# Patient Record
Sex: MALE | Race: WHITE | NOT HISPANIC OR LATINO | Employment: OTHER | URBAN - METROPOLITAN AREA
[De-identification: names, ages, dates, MRNs, and addresses within clinical notes are randomized per-mention and may not be internally consistent; named-entity substitution may affect disease eponyms.]

---

## 2017-02-15 ENCOUNTER — ALLSCRIPTS OFFICE VISIT (OUTPATIENT)
Dept: OTHER | Facility: OTHER | Age: 77
End: 2017-02-15

## 2017-02-15 ENCOUNTER — HOSPITAL ENCOUNTER (INPATIENT)
Facility: HOSPITAL | Age: 77
LOS: 3 days | Discharge: HOME HEALTH | DRG: 394 | End: 2017-02-18
Attending: EMERGENCY MEDICINE | Admitting: FAMILY MEDICINE
Payer: MEDICARE

## 2017-02-15 ENCOUNTER — APPOINTMENT (EMERGENCY)
Dept: CT IMAGING | Facility: HOSPITAL | Age: 77
DRG: 394 | End: 2017-02-15
Payer: MEDICARE

## 2017-02-15 DIAGNOSIS — F03.91 DEMENTIA WITH BEHAVIORAL DISTURBANCE, UNSPECIFIED DEMENTIA TYPE (HCC): Chronic | ICD-10-CM

## 2017-02-15 DIAGNOSIS — R73.9 HYPERGLYCEMIA: ICD-10-CM

## 2017-02-15 DIAGNOSIS — K62.5 RECTAL BLEEDING: ICD-10-CM

## 2017-02-15 DIAGNOSIS — R41.82 ALTERED MENTAL STATUS: Primary | ICD-10-CM

## 2017-02-15 DIAGNOSIS — N39.0 UTI (URINARY TRACT INFECTION): ICD-10-CM

## 2017-02-15 DIAGNOSIS — E87.1 HYPONATREMIA: ICD-10-CM

## 2017-02-15 PROBLEM — I10 HTN (HYPERTENSION): Chronic | Status: ACTIVE | Noted: 2017-02-15

## 2017-02-15 PROBLEM — I48.91 A-FIB (HCC): Chronic | Status: ACTIVE | Noted: 2017-02-15

## 2017-02-15 PROBLEM — I63.9 CVA (CEREBRAL VASCULAR ACCIDENT) (HCC): Chronic | Status: ACTIVE | Noted: 2017-02-15

## 2017-02-15 PROBLEM — E78.5 HYPERLIPIDEMIA: Chronic | Status: ACTIVE | Noted: 2017-02-15

## 2017-02-15 PROBLEM — E11.9 DIABETES MELLITUS (HCC): Chronic | Status: ACTIVE | Noted: 2017-02-15

## 2017-02-15 LAB
ABO GROUP BLD: NORMAL
ALBUMIN SERPL BCP-MCNC: 2.5 G/DL (ref 3.5–5)
ALP SERPL-CCNC: 76 U/L (ref 46–116)
ALT SERPL W P-5'-P-CCNC: 13 U/L (ref 12–78)
ANION GAP SERPL CALCULATED.3IONS-SCNC: 12 MMOL/L (ref 4–13)
APTT PPP: 35 SECONDS (ref 24–36)
AST SERPL W P-5'-P-CCNC: 23 U/L (ref 5–45)
BACTERIA UR QL AUTO: ABNORMAL /HPF
BASOPHILS # BLD AUTO: 0.01 THOUSANDS/ΜL (ref 0–0.1)
BASOPHILS NFR BLD AUTO: 0 % (ref 0–1)
BILIRUB SERPL-MCNC: 0.8 MG/DL (ref 0.2–1)
BILIRUB UR QL STRIP: NEGATIVE
BLD GP AB SCN SERPL QL: NEGATIVE
BUN SERPL-MCNC: 23 MG/DL (ref 5–25)
CALCIUM SERPL-MCNC: 9.2 MG/DL (ref 8.3–10.1)
CHLORIDE SERPL-SCNC: 91 MMOL/L (ref 100–108)
CLARITY UR: ABNORMAL
CO2 SERPL-SCNC: 26 MMOL/L (ref 21–32)
COLOR UR: YELLOW
CREAT SERPL-MCNC: 1.09 MG/DL (ref 0.6–1.3)
EOSINOPHIL # BLD AUTO: 0.01 THOUSAND/ΜL (ref 0–0.61)
EOSINOPHIL NFR BLD AUTO: 0 % (ref 0–6)
ERYTHROCYTE [DISTWIDTH] IN BLOOD BY AUTOMATED COUNT: 16.6 % (ref 11.6–15.1)
GFR SERPL CREATININE-BSD FRML MDRD: >60 ML/MIN/1.73SQ M
GLUCOSE SERPL-MCNC: 326 MG/DL (ref 65–140)
GLUCOSE SERPL-MCNC: 379 MG/DL (ref 65–140)
GLUCOSE UR STRIP-MCNC: ABNORMAL MG/DL
HCT VFR BLD AUTO: 36.6 % (ref 36.5–49.3)
HGB BLD-MCNC: 12.4 G/DL (ref 12–17)
HGB UR QL STRIP.AUTO: ABNORMAL
INR PPP: 1.27 (ref 0.86–1.16)
KETONES UR STRIP-MCNC: ABNORMAL MG/DL
LEUKOCYTE ESTERASE UR QL STRIP: ABNORMAL
LYMPHOCYTES # BLD AUTO: 0.62 THOUSANDS/ΜL (ref 0.6–4.47)
LYMPHOCYTES NFR BLD AUTO: 9 % (ref 14–44)
MCH RBC QN AUTO: 29.9 PG (ref 26.8–34.3)
MCHC RBC AUTO-ENTMCNC: 33.9 G/DL (ref 31.4–37.4)
MCV RBC AUTO: 88 FL (ref 82–98)
MONOCYTES # BLD AUTO: 0.59 THOUSAND/ΜL (ref 0.17–1.22)
MONOCYTES NFR BLD AUTO: 8 % (ref 4–12)
NEUTROPHILS # BLD AUTO: 5.81 THOUSANDS/ΜL (ref 1.85–7.62)
NEUTS SEG NFR BLD AUTO: 79 % (ref 43–75)
NITRITE UR QL STRIP: POSITIVE
NON-SQ EPI CELLS URNS QL MICRO: ABNORMAL /HPF
NRBC BLD AUTO-RTO: 0 /100 WBCS
OSMOLALITY UR: 551 MMOL/KG
PH UR STRIP.AUTO: 5.5 [PH] (ref 4.5–8)
PLATELET # BLD AUTO: 160 THOUSANDS/UL (ref 149–390)
PMV BLD AUTO: 11.6 FL (ref 8.9–12.7)
POTASSIUM SERPL-SCNC: 4 MMOL/L (ref 3.5–5.3)
PROT SERPL-MCNC: 7.4 G/DL (ref 6.4–8.2)
PROT UR STRIP-MCNC: ABNORMAL MG/DL
PROTHROMBIN TIME: 15.7 SECONDS (ref 12–14.3)
RBC # BLD AUTO: 4.15 MILLION/UL (ref 3.88–5.62)
RBC #/AREA URNS AUTO: ABNORMAL /HPF
RH BLD: NEGATIVE
SODIUM 24H UR-SCNC: 30 MOL/L
SODIUM SERPL-SCNC: 129 MMOL/L (ref 136–145)
SP GR UR STRIP.AUTO: 1.01 (ref 1–1.03)
TROPONIN I SERPL-MCNC: <0.02 NG/ML
UROBILINOGEN UR QL STRIP.AUTO: 0.2 E.U./DL
WBC # BLD AUTO: 7.33 THOUSAND/UL (ref 4.31–10.16)
WBC #/AREA URNS AUTO: ABNORMAL /HPF

## 2017-02-15 PROCEDURE — 84484 ASSAY OF TROPONIN QUANT: CPT | Performed by: EMERGENCY MEDICINE

## 2017-02-15 PROCEDURE — 81001 URINALYSIS AUTO W/SCOPE: CPT | Performed by: EMERGENCY MEDICINE

## 2017-02-15 PROCEDURE — 85610 PROTHROMBIN TIME: CPT | Performed by: EMERGENCY MEDICINE

## 2017-02-15 PROCEDURE — 86900 BLOOD TYPING SEROLOGIC ABO: CPT | Performed by: EMERGENCY MEDICINE

## 2017-02-15 PROCEDURE — 96372 THER/PROPH/DIAG INJ SC/IM: CPT

## 2017-02-15 PROCEDURE — 83935 ASSAY OF URINE OSMOLALITY: CPT | Performed by: FAMILY MEDICINE

## 2017-02-15 PROCEDURE — 85730 THROMBOPLASTIN TIME PARTIAL: CPT | Performed by: EMERGENCY MEDICINE

## 2017-02-15 PROCEDURE — 87077 CULTURE AEROBIC IDENTIFY: CPT | Performed by: EMERGENCY MEDICINE

## 2017-02-15 PROCEDURE — 86901 BLOOD TYPING SEROLOGIC RH(D): CPT | Performed by: EMERGENCY MEDICINE

## 2017-02-15 PROCEDURE — 96360 HYDRATION IV INFUSION INIT: CPT

## 2017-02-15 PROCEDURE — 86850 RBC ANTIBODY SCREEN: CPT | Performed by: EMERGENCY MEDICINE

## 2017-02-15 PROCEDURE — 82948 REAGENT STRIP/BLOOD GLUCOSE: CPT

## 2017-02-15 PROCEDURE — 87086 URINE CULTURE/COLONY COUNT: CPT | Performed by: EMERGENCY MEDICINE

## 2017-02-15 PROCEDURE — 96361 HYDRATE IV INFUSION ADD-ON: CPT

## 2017-02-15 PROCEDURE — 80053 COMPREHEN METABOLIC PANEL: CPT | Performed by: EMERGENCY MEDICINE

## 2017-02-15 PROCEDURE — 87186 SC STD MICRODIL/AGAR DIL: CPT | Performed by: EMERGENCY MEDICINE

## 2017-02-15 PROCEDURE — 84300 ASSAY OF URINE SODIUM: CPT | Performed by: FAMILY MEDICINE

## 2017-02-15 PROCEDURE — 36415 COLL VENOUS BLD VENIPUNCTURE: CPT | Performed by: EMERGENCY MEDICINE

## 2017-02-15 PROCEDURE — 93005 ELECTROCARDIOGRAM TRACING: CPT | Performed by: EMERGENCY MEDICINE

## 2017-02-15 PROCEDURE — 70450 CT HEAD/BRAIN W/O DYE: CPT

## 2017-02-15 PROCEDURE — 85025 COMPLETE CBC W/AUTO DIFF WBC: CPT | Performed by: EMERGENCY MEDICINE

## 2017-02-15 RX ORDER — HALOPERIDOL 5 MG/ML
5 INJECTION INTRAMUSCULAR ONCE
Status: COMPLETED | OUTPATIENT
Start: 2017-02-15 | End: 2017-02-15

## 2017-02-15 RX ORDER — GEMFIBROZIL 600 MG/1
600 TABLET, FILM COATED ORAL
COMMUNITY

## 2017-02-15 RX ORDER — GEMFIBROZIL 600 MG/1
600 TABLET, FILM COATED ORAL
Status: DISCONTINUED | OUTPATIENT
Start: 2017-02-16 | End: 2017-02-18 | Stop reason: HOSPADM

## 2017-02-15 RX ORDER — LEVETIRACETAM 500 MG/1
500 TABLET ORAL 2 TIMES DAILY
Status: DISCONTINUED | OUTPATIENT
Start: 2017-02-15 | End: 2017-02-18 | Stop reason: HOSPADM

## 2017-02-15 RX ORDER — AZATHIOPRINE 50 MG/1
50 TABLET ORAL DAILY
Status: DISCONTINUED | OUTPATIENT
Start: 2017-02-16 | End: 2017-02-18 | Stop reason: HOSPADM

## 2017-02-15 RX ORDER — LEVETIRACETAM 500 MG/1
500 TABLET ORAL 2 TIMES DAILY
COMMUNITY
End: 2018-01-29 | Stop reason: SDUPTHER

## 2017-02-15 RX ORDER — RISPERIDONE 0.25 MG/1
0.5 TABLET, FILM COATED ORAL
Status: DISCONTINUED | OUTPATIENT
Start: 2017-02-15 | End: 2017-02-18 | Stop reason: HOSPADM

## 2017-02-15 RX ORDER — RISPERIDONE 0.5 MG/1
0.5 TABLET, FILM COATED ORAL
COMMUNITY
End: 2018-03-10

## 2017-02-15 RX ORDER — ATORVASTATIN CALCIUM 20 MG/1
20 TABLET, FILM COATED ORAL DAILY
COMMUNITY
End: 2018-01-26 | Stop reason: SDUPTHER

## 2017-02-15 RX ORDER — HALOPERIDOL 5 MG/ML
INJECTION INTRAMUSCULAR
Status: DISPENSED
Start: 2017-02-15 | End: 2017-02-16

## 2017-02-15 RX ORDER — MESALAMINE 375 MG/1
750 CAPSULE, EXTENDED RELEASE ORAL DAILY
COMMUNITY

## 2017-02-15 RX ORDER — DONEPEZIL HYDROCHLORIDE 5 MG/1
10 TABLET, FILM COATED ORAL
Status: DISCONTINUED | OUTPATIENT
Start: 2017-02-15 | End: 2017-02-18 | Stop reason: HOSPADM

## 2017-02-15 RX ORDER — LORAZEPAM 2 MG/ML
1 INJECTION INTRAMUSCULAR EVERY 6 HOURS PRN
Status: DISCONTINUED | OUTPATIENT
Start: 2017-02-15 | End: 2017-02-18 | Stop reason: HOSPADM

## 2017-02-15 RX ORDER — LORAZEPAM 2 MG/ML
1 INJECTION INTRAMUSCULAR ONCE
Status: DISCONTINUED | OUTPATIENT
Start: 2017-02-15 | End: 2017-02-18 | Stop reason: HOSPADM

## 2017-02-15 RX ORDER — AZATHIOPRINE 50 MG/1
50 TABLET ORAL DAILY
COMMUNITY

## 2017-02-15 RX ORDER — MESALAMINE 400 MG/1
400 CAPSULE, DELAYED RELEASE ORAL 3 TIMES DAILY
Status: DISCONTINUED | OUTPATIENT
Start: 2017-02-15 | End: 2017-02-18 | Stop reason: HOSPADM

## 2017-02-15 RX ORDER — ATORVASTATIN CALCIUM 20 MG/1
20 TABLET, FILM COATED ORAL DAILY
Status: DISCONTINUED | OUTPATIENT
Start: 2017-02-16 | End: 2017-02-18 | Stop reason: HOSPADM

## 2017-02-15 RX ORDER — PIOGLITAZONE HCL AND METFORMIN HCL 850; 15 MG/1; MG/1
1 TABLET ORAL 2 TIMES DAILY WITH MEALS
COMMUNITY
End: 2018-03-16 | Stop reason: HOSPADM

## 2017-02-15 RX ORDER — SODIUM CHLORIDE 9 MG/ML
75 INJECTION, SOLUTION INTRAVENOUS CONTINUOUS
Status: DISCONTINUED | OUTPATIENT
Start: 2017-02-15 | End: 2017-02-18 | Stop reason: HOSPADM

## 2017-02-15 RX ORDER — DONEPEZIL HYDROCHLORIDE 10 MG/1
10 TABLET, FILM COATED ORAL
COMMUNITY

## 2017-02-15 RX ADMIN — SODIUM CHLORIDE 75 ML/HR: 0.9 INJECTION, SOLUTION INTRAVENOUS at 23:44

## 2017-02-15 RX ADMIN — RISPERIDONE 0.5 MG: 1 TABLET, FILM COATED ORAL at 21:22

## 2017-02-15 RX ADMIN — SODIUM CHLORIDE 75 ML/HR: 0.9 INJECTION, SOLUTION INTRAVENOUS at 21:23

## 2017-02-15 RX ADMIN — SERTRALINE HYDROCHLORIDE 50 MG: 50 TABLET ORAL at 21:21

## 2017-02-15 RX ADMIN — LORAZEPAM 1 MG: 2 INJECTION INTRAMUSCULAR; INTRAVENOUS at 20:03

## 2017-02-15 RX ADMIN — LEVETIRACETAM 500 MG: 500 TABLET ORAL at 21:14

## 2017-02-15 RX ADMIN — APIXABAN 2.5 MG: 2.5 TABLET, FILM COATED ORAL at 21:13

## 2017-02-15 RX ADMIN — HALOPERIDOL LACTATE 5 MG: 5 INJECTION, SOLUTION INTRAMUSCULAR at 16:30

## 2017-02-15 RX ADMIN — CEFTRIAXONE 1000 MG: 1 INJECTION, POWDER, FOR SOLUTION INTRAMUSCULAR; INTRAVENOUS at 22:29

## 2017-02-15 RX ADMIN — SODIUM CHLORIDE 1000 ML: 0.9 INJECTION, SOLUTION INTRAVENOUS at 14:04

## 2017-02-15 RX ADMIN — INSULIN LISPRO 5 UNITS: 100 INJECTION, SOLUTION INTRAVENOUS; SUBCUTANEOUS at 21:27

## 2017-02-15 RX ADMIN — HALOPERIDOL LACTATE 5 MG: 5 INJECTION, SOLUTION INTRAMUSCULAR at 17:04

## 2017-02-15 RX ADMIN — DONEPEZIL HYDROCHLORIDE 10 MG: 5 TABLET, FILM COATED ORAL at 21:19

## 2017-02-15 RX ADMIN — SODIUM CHLORIDE 1000 ML: 0.9 INJECTION, SOLUTION INTRAVENOUS at 17:50

## 2017-02-16 LAB
ANION GAP SERPL CALCULATED.3IONS-SCNC: 13 MMOL/L (ref 4–13)
ATRIAL RATE: 75 BPM
BUN SERPL-MCNC: 14 MG/DL (ref 5–25)
CALCIUM SERPL-MCNC: 8.3 MG/DL (ref 8.3–10.1)
CHLORIDE SERPL-SCNC: 99 MMOL/L (ref 100–108)
CO2 SERPL-SCNC: 25 MMOL/L (ref 21–32)
CREAT SERPL-MCNC: 0.81 MG/DL (ref 0.6–1.3)
ERYTHROCYTE [DISTWIDTH] IN BLOOD BY AUTOMATED COUNT: 16.8 % (ref 11.6–15.1)
GFR SERPL CREATININE-BSD FRML MDRD: >60 ML/MIN/1.73SQ M
GLUCOSE SERPL-MCNC: 153 MG/DL (ref 65–140)
GLUCOSE SERPL-MCNC: 247 MG/DL (ref 65–140)
GLUCOSE SERPL-MCNC: 252 MG/DL (ref 65–140)
GLUCOSE SERPL-MCNC: 256 MG/DL (ref 65–140)
GLUCOSE SERPL-MCNC: 287 MG/DL (ref 65–140)
HCT VFR BLD AUTO: 34.7 % (ref 36.5–49.3)
HGB BLD-MCNC: 11.8 G/DL (ref 12–17)
LACTATE SERPL-SCNC: 0.9 MMOL/L (ref 0.5–2)
MCH RBC QN AUTO: 30.3 PG (ref 26.8–34.3)
MCHC RBC AUTO-ENTMCNC: 34 G/DL (ref 31.4–37.4)
MCV RBC AUTO: 89 FL (ref 82–98)
P AXIS: 57 DEGREES
PLATELET # BLD AUTO: 182 THOUSANDS/UL (ref 149–390)
PMV BLD AUTO: 11 FL (ref 8.9–12.7)
POTASSIUM SERPL-SCNC: 3.2 MMOL/L (ref 3.5–5.3)
POTASSIUM SERPL-SCNC: 3.6 MMOL/L (ref 3.5–5.3)
PR INTERVAL: 156 MS
QRS AXIS: -8 DEGREES
QRSD INTERVAL: 114 MS
QT INTERVAL: 406 MS
QTC INTERVAL: 453 MS
RBC # BLD AUTO: 3.9 MILLION/UL (ref 3.88–5.62)
SODIUM SERPL-SCNC: 137 MMOL/L (ref 136–145)
T WAVE AXIS: 26 DEGREES
VENTRICULAR RATE: 75 BPM
WBC # BLD AUTO: 7.08 THOUSAND/UL (ref 4.31–10.16)

## 2017-02-16 PROCEDURE — 82948 REAGENT STRIP/BLOOD GLUCOSE: CPT

## 2017-02-16 PROCEDURE — 84132 ASSAY OF SERUM POTASSIUM: CPT | Performed by: PHYSICIAN ASSISTANT

## 2017-02-16 PROCEDURE — 80048 BASIC METABOLIC PNL TOTAL CA: CPT | Performed by: PHYSICIAN ASSISTANT

## 2017-02-16 PROCEDURE — 99285 EMERGENCY DEPT VISIT HI MDM: CPT

## 2017-02-16 PROCEDURE — 36415 COLL VENOUS BLD VENIPUNCTURE: CPT | Performed by: FAMILY MEDICINE

## 2017-02-16 PROCEDURE — 83605 ASSAY OF LACTIC ACID: CPT | Performed by: INTERNAL MEDICINE

## 2017-02-16 PROCEDURE — 85027 COMPLETE CBC AUTOMATED: CPT | Performed by: FAMILY MEDICINE

## 2017-02-16 RX ORDER — INSULIN GLARGINE 100 [IU]/ML
10 INJECTION, SOLUTION SUBCUTANEOUS EVERY MORNING
Status: DISCONTINUED | OUTPATIENT
Start: 2017-02-16 | End: 2017-02-18 | Stop reason: HOSPADM

## 2017-02-16 RX ORDER — POTASSIUM CHLORIDE 20 MEQ/1
20 TABLET, EXTENDED RELEASE ORAL ONCE
Status: DISCONTINUED | OUTPATIENT
Start: 2017-02-16 | End: 2017-02-16

## 2017-02-16 RX ORDER — POTASSIUM CHLORIDE 14.9 MG/ML
20 INJECTION INTRAVENOUS ONCE
Status: COMPLETED | OUTPATIENT
Start: 2017-02-16 | End: 2017-02-16

## 2017-02-16 RX ADMIN — POTASSIUM CHLORIDE 20 MEQ: 200 INJECTION, SOLUTION INTRAVENOUS at 07:42

## 2017-02-16 RX ADMIN — INSULIN LISPRO 3 UNITS: 100 INJECTION, SOLUTION INTRAVENOUS; SUBCUTANEOUS at 22:16

## 2017-02-16 RX ADMIN — INSULIN LISPRO 12 UNITS: 100 INJECTION, SOLUTION INTRAVENOUS; SUBCUTANEOUS at 07:21

## 2017-02-16 RX ADMIN — CEFTRIAXONE 1000 MG: 1 INJECTION, POWDER, FOR SOLUTION INTRAMUSCULAR; INTRAVENOUS at 22:15

## 2017-02-17 LAB
BASOPHILS # BLD AUTO: 0.01 THOUSANDS/ΜL (ref 0–0.1)
BASOPHILS NFR BLD AUTO: 0 % (ref 0–1)
EOSINOPHIL # BLD AUTO: 0.02 THOUSAND/ΜL (ref 0–0.61)
EOSINOPHIL NFR BLD AUTO: 0 % (ref 0–6)
ERYTHROCYTE [DISTWIDTH] IN BLOOD BY AUTOMATED COUNT: 17.2 % (ref 11.6–15.1)
GLUCOSE SERPL-MCNC: 163 MG/DL (ref 65–140)
GLUCOSE SERPL-MCNC: 231 MG/DL (ref 65–140)
GLUCOSE SERPL-MCNC: 244 MG/DL (ref 65–140)
GLUCOSE SERPL-MCNC: 264 MG/DL (ref 65–140)
HCT VFR BLD AUTO: 36 % (ref 36.5–49.3)
HGB BLD-MCNC: 12.2 G/DL (ref 12–17)
LYMPHOCYTES # BLD AUTO: 0.76 THOUSANDS/ΜL (ref 0.6–4.47)
LYMPHOCYTES NFR BLD AUTO: 10 % (ref 14–44)
MCH RBC QN AUTO: 30.4 PG (ref 26.8–34.3)
MCHC RBC AUTO-ENTMCNC: 33.9 G/DL (ref 31.4–37.4)
MCV RBC AUTO: 90 FL (ref 82–98)
MONOCYTES # BLD AUTO: 0.58 THOUSAND/ΜL (ref 0.17–1.22)
MONOCYTES NFR BLD AUTO: 8 % (ref 4–12)
NEUTROPHILS # BLD AUTO: 5.66 THOUSANDS/ΜL (ref 1.85–7.62)
NEUTS SEG NFR BLD AUTO: 76 % (ref 43–75)
NRBC BLD AUTO-RTO: 0 /100 WBCS
PLATELET # BLD AUTO: 233 THOUSANDS/UL (ref 149–390)
PMV BLD AUTO: 10.9 FL (ref 8.9–12.7)
RBC # BLD AUTO: 4.01 MILLION/UL (ref 3.88–5.62)
WBC # BLD AUTO: 7.43 THOUSAND/UL (ref 4.31–10.16)

## 2017-02-17 PROCEDURE — 82948 REAGENT STRIP/BLOOD GLUCOSE: CPT

## 2017-02-17 PROCEDURE — 85025 COMPLETE CBC W/AUTO DIFF WBC: CPT | Performed by: INTERNAL MEDICINE

## 2017-02-17 RX ORDER — LISINOPRIL 10 MG/1
10 TABLET ORAL DAILY
Status: DISCONTINUED | OUTPATIENT
Start: 2017-02-17 | End: 2017-02-18 | Stop reason: HOSPADM

## 2017-02-17 RX ORDER — HYDROCORTISONE ACETATE 25 MG/1
25 SUPPOSITORY RECTAL 2 TIMES DAILY
Status: DISCONTINUED | OUTPATIENT
Start: 2017-02-17 | End: 2017-02-18 | Stop reason: HOSPADM

## 2017-02-17 RX ADMIN — HYDROCORTISONE ACETATE 25 MG: 25 SUPPOSITORY RECTAL at 12:25

## 2017-02-17 RX ADMIN — ATORVASTATIN CALCIUM 20 MG: 20 TABLET, FILM COATED ORAL at 09:42

## 2017-02-17 RX ADMIN — METFORMIN HYDROCHLORIDE: 500 TABLET, FILM COATED ORAL at 17:21

## 2017-02-17 RX ADMIN — APIXABAN 2.5 MG: 2.5 TABLET, FILM COATED ORAL at 17:22

## 2017-02-17 RX ADMIN — SERTRALINE HYDROCHLORIDE 50 MG: 50 TABLET ORAL at 23:22

## 2017-02-17 RX ADMIN — INSULIN LISPRO 1 UNITS: 100 INJECTION, SOLUTION INTRAVENOUS; SUBCUTANEOUS at 21:21

## 2017-02-17 RX ADMIN — MESALAMINE 400 MG: 400 CAPSULE, DELAYED RELEASE ORAL at 17:22

## 2017-02-17 RX ADMIN — INSULIN LISPRO 8 UNITS: 100 INJECTION, SOLUTION INTRAVENOUS; SUBCUTANEOUS at 07:00

## 2017-02-17 RX ADMIN — GEMFIBROZIL 600 MG: 600 TABLET ORAL at 17:21

## 2017-02-17 RX ADMIN — MESALAMINE 400 MG: 400 CAPSULE, DELAYED RELEASE ORAL at 09:42

## 2017-02-17 RX ADMIN — GEMFIBROZIL 600 MG: 600 TABLET ORAL at 09:42

## 2017-02-17 RX ADMIN — LISINOPRIL 10 MG: 10 TABLET ORAL at 10:20

## 2017-02-17 RX ADMIN — INSULIN LISPRO 8 UNITS: 100 INJECTION, SOLUTION INTRAVENOUS; SUBCUTANEOUS at 12:25

## 2017-02-17 RX ADMIN — INSULIN LISPRO 8 UNITS: 100 INJECTION, SOLUTION INTRAVENOUS; SUBCUTANEOUS at 17:30

## 2017-02-17 RX ADMIN — CEFTRIAXONE 1000 MG: 1 INJECTION, POWDER, FOR SOLUTION INTRAMUSCULAR; INTRAVENOUS at 23:22

## 2017-02-17 RX ADMIN — INSULIN GLARGINE 10 UNITS: 100 INJECTION, SOLUTION SUBCUTANEOUS at 09:51

## 2017-02-17 RX ADMIN — LEVETIRACETAM 500 MG: 500 TABLET ORAL at 09:42

## 2017-02-17 RX ADMIN — APIXABAN 2.5 MG: 2.5 TABLET, FILM COATED ORAL at 09:42

## 2017-02-17 RX ADMIN — METFORMIN HYDROCHLORIDE: 500 TABLET, FILM COATED ORAL at 10:24

## 2017-02-17 RX ADMIN — AZATHIOPRINE 50 MG: 50 TABLET ORAL at 09:42

## 2017-02-17 RX ADMIN — LEVETIRACETAM 500 MG: 500 TABLET ORAL at 17:21

## 2017-02-17 RX ADMIN — MESALAMINE 400 MG: 400 CAPSULE, DELAYED RELEASE ORAL at 21:18

## 2017-02-17 RX ADMIN — DONEPEZIL HYDROCHLORIDE 10 MG: 5 TABLET, FILM COATED ORAL at 21:18

## 2017-02-17 RX ADMIN — HYDROCORTISONE ACETATE 25 MG: 25 SUPPOSITORY RECTAL at 20:31

## 2017-02-17 RX ADMIN — RISPERIDONE 0.5 MG: 1 TABLET, FILM COATED ORAL at 21:18

## 2017-02-18 VITALS
OXYGEN SATURATION: 96 % | BODY MASS INDEX: 32.65 KG/M2 | WEIGHT: 208 LBS | RESPIRATION RATE: 18 BRPM | DIASTOLIC BLOOD PRESSURE: 62 MMHG | HEART RATE: 89 BPM | SYSTOLIC BLOOD PRESSURE: 135 MMHG | HEIGHT: 67 IN | TEMPERATURE: 97.8 F

## 2017-02-18 PROBLEM — E87.1 HYPONATREMIA: Status: RESOLVED | Noted: 2017-02-15 | Resolved: 2017-02-18

## 2017-02-18 PROBLEM — I63.9 CVA (CEREBRAL VASCULAR ACCIDENT) (HCC): Chronic | Status: RESOLVED | Noted: 2017-02-15 | Resolved: 2017-02-18

## 2017-02-18 PROBLEM — N39.0 UTI (URINARY TRACT INFECTION): Status: ACTIVE | Noted: 2017-02-18

## 2017-02-18 PROBLEM — K62.5 RECTAL BLEED: Status: RESOLVED | Noted: 2017-02-15 | Resolved: 2017-02-18

## 2017-02-18 PROBLEM — R41.82 ALTERED MENTAL STATUS: Status: RESOLVED | Noted: 2017-02-15 | Resolved: 2017-02-18

## 2017-02-18 LAB
CRP SERPL QL: 222.6 MG/L
ERYTHROCYTE [SEDIMENTATION RATE] IN BLOOD: 82 MM/HOUR (ref 0–10)
GLUCOSE SERPL-MCNC: 240 MG/DL (ref 65–140)
GLUCOSE SERPL-MCNC: 263 MG/DL (ref 65–140)

## 2017-02-18 PROCEDURE — 97163 PT EVAL HIGH COMPLEX 45 MIN: CPT

## 2017-02-18 PROCEDURE — 86140 C-REACTIVE PROTEIN: CPT | Performed by: INTERNAL MEDICINE

## 2017-02-18 PROCEDURE — 82948 REAGENT STRIP/BLOOD GLUCOSE: CPT

## 2017-02-18 PROCEDURE — G8979 MOBILITY GOAL STATUS: HCPCS

## 2017-02-18 PROCEDURE — G8978 MOBILITY CURRENT STATUS: HCPCS

## 2017-02-18 PROCEDURE — 85652 RBC SED RATE AUTOMATED: CPT | Performed by: INTERNAL MEDICINE

## 2017-02-18 RX ORDER — CEPHALEXIN 500 MG/1
500 CAPSULE ORAL 4 TIMES DAILY
Qty: 28 CAPSULE | Refills: 0 | Status: SHIPPED | OUTPATIENT
Start: 2017-02-18 | End: 2017-02-25

## 2017-02-18 RX ORDER — HYDROCORTISONE ACETATE 25 MG/1
25 SUPPOSITORY RECTAL 2 TIMES DAILY
Qty: 60 SUPPOSITORY | Refills: 0 | Status: SHIPPED | OUTPATIENT
Start: 2017-02-18 | End: 2018-03-16 | Stop reason: HOSPADM

## 2017-02-18 RX ORDER — LISINOPRIL 10 MG/1
10 TABLET ORAL DAILY
Qty: 30 TABLET | Refills: 0 | Status: SHIPPED | OUTPATIENT
Start: 2017-02-18 | End: 2018-03-10

## 2017-02-18 RX ORDER — LORAZEPAM 0.5 MG/1
0.5 TABLET ORAL EVERY 8 HOURS PRN
Qty: 10 TABLET | Refills: 0 | Status: SHIPPED | OUTPATIENT
Start: 2017-02-18 | End: 2018-03-05

## 2017-02-18 RX ADMIN — ATORVASTATIN CALCIUM 20 MG: 20 TABLET, FILM COATED ORAL at 09:55

## 2017-02-18 RX ADMIN — INSULIN LISPRO 8 UNITS: 100 INJECTION, SOLUTION INTRAVENOUS; SUBCUTANEOUS at 12:24

## 2017-02-18 RX ADMIN — MESALAMINE 400 MG: 400 CAPSULE, DELAYED RELEASE ORAL at 09:54

## 2017-02-18 RX ADMIN — INSULIN GLARGINE 10 UNITS: 100 INJECTION, SOLUTION SUBCUTANEOUS at 10:01

## 2017-02-18 RX ADMIN — METFORMIN HYDROCHLORIDE: 500 TABLET, FILM COATED ORAL at 09:54

## 2017-02-18 RX ADMIN — GEMFIBROZIL 600 MG: 600 TABLET ORAL at 06:24

## 2017-02-18 RX ADMIN — LEVETIRACETAM 500 MG: 500 TABLET ORAL at 09:55

## 2017-02-18 RX ADMIN — LISINOPRIL 10 MG: 10 TABLET ORAL at 09:55

## 2017-02-18 RX ADMIN — APIXABAN 2.5 MG: 2.5 TABLET, FILM COATED ORAL at 09:55

## 2017-02-18 RX ADMIN — AZATHIOPRINE 50 MG: 50 TABLET ORAL at 09:55

## 2017-02-18 RX ADMIN — INSULIN LISPRO 8 UNITS: 100 INJECTION, SOLUTION INTRAVENOUS; SUBCUTANEOUS at 07:51

## 2017-02-20 LAB
BACTERIA UR CULT: NORMAL
BACTERIA UR CULT: NORMAL

## 2017-03-01 ENCOUNTER — ALLSCRIPTS OFFICE VISIT (OUTPATIENT)
Dept: OTHER | Facility: OTHER | Age: 77
End: 2017-03-01

## 2017-03-01 DIAGNOSIS — K62.5 HEMORRHAGE OF ANUS AND RECTUM: ICD-10-CM

## 2017-03-01 DIAGNOSIS — E11.9 TYPE 2 DIABETES MELLITUS WITHOUT COMPLICATIONS (HCC): ICD-10-CM

## 2017-03-01 DIAGNOSIS — E78.5 HYPERLIPIDEMIA: ICD-10-CM

## 2017-03-07 ENCOUNTER — ALLSCRIPTS OFFICE VISIT (OUTPATIENT)
Dept: OTHER | Facility: OTHER | Age: 77
End: 2017-03-07

## 2017-03-21 ENCOUNTER — ANESTHESIA (OUTPATIENT)
Dept: PERIOP | Facility: HOSPITAL | Age: 77
End: 2017-03-21
Payer: MEDICARE

## 2017-03-21 ENCOUNTER — ANESTHESIA EVENT (OUTPATIENT)
Dept: PERIOP | Facility: HOSPITAL | Age: 77
End: 2017-03-21
Payer: MEDICARE

## 2017-03-21 ENCOUNTER — HOSPITAL ENCOUNTER (OUTPATIENT)
Facility: HOSPITAL | Age: 77
Setting detail: OUTPATIENT SURGERY
Discharge: HOME | End: 2017-03-21
Attending: INTERNAL MEDICINE | Admitting: INTERNAL MEDICINE
Payer: MEDICARE

## 2017-03-21 ENCOUNTER — GENERIC CONVERSION - ENCOUNTER (OUTPATIENT)
Dept: OTHER | Facility: OTHER | Age: 77
End: 2017-03-21

## 2017-03-21 VITALS
RESPIRATION RATE: 18 BRPM | SYSTOLIC BLOOD PRESSURE: 126 MMHG | DIASTOLIC BLOOD PRESSURE: 71 MMHG | HEIGHT: 66 IN | OXYGEN SATURATION: 96 % | WEIGHT: 205.47 LBS | TEMPERATURE: 98.4 F | BODY MASS INDEX: 33.02 KG/M2 | HEART RATE: 64 BPM

## 2017-03-21 LAB — GLUCOSE SERPL-MCNC: 279 MG/DL (ref 65–140)

## 2017-03-21 PROCEDURE — 82948 REAGENT STRIP/BLOOD GLUCOSE: CPT

## 2017-03-21 RX ORDER — LIDOCAINE HYDROCHLORIDE 10 MG/ML
INJECTION, SOLUTION INFILTRATION; PERINEURAL AS NEEDED
Status: DISCONTINUED | OUTPATIENT
Start: 2017-03-21 | End: 2017-03-21 | Stop reason: SURG

## 2017-03-21 RX ORDER — PROPOFOL 10 MG/ML
INJECTION, EMULSION INTRAVENOUS AS NEEDED
Status: DISCONTINUED | OUTPATIENT
Start: 2017-03-21 | End: 2017-03-21 | Stop reason: SURG

## 2017-03-21 RX ORDER — SODIUM CHLORIDE, SODIUM LACTATE, POTASSIUM CHLORIDE, CALCIUM CHLORIDE 600; 310; 30; 20 MG/100ML; MG/100ML; MG/100ML; MG/100ML
50 INJECTION, SOLUTION INTRAVENOUS CONTINUOUS
Status: DISCONTINUED | OUTPATIENT
Start: 2017-03-21 | End: 2017-03-21 | Stop reason: HOSPADM

## 2017-03-21 RX ADMIN — PROPOFOL 100 MG: 10 INJECTION, EMULSION INTRAVENOUS at 07:58

## 2017-03-21 RX ADMIN — PROPOFOL 20 MG: 10 INJECTION, EMULSION INTRAVENOUS at 08:00

## 2017-03-21 RX ADMIN — LIDOCAINE HYDROCHLORIDE 50 MG: 10 INJECTION, SOLUTION INFILTRATION; PERINEURAL at 07:55

## 2017-03-21 RX ADMIN — SODIUM CHLORIDE, SODIUM LACTATE, POTASSIUM CHLORIDE, AND CALCIUM CHLORIDE 50 ML/HR: .6; .31; .03; .02 INJECTION, SOLUTION INTRAVENOUS at 07:21

## 2017-11-15 DIAGNOSIS — E11.9 TYPE 2 DIABETES MELLITUS WITHOUT COMPLICATIONS (HCC): ICD-10-CM

## 2017-11-15 DIAGNOSIS — I10 ESSENTIAL (PRIMARY) HYPERTENSION: ICD-10-CM

## 2017-11-17 ENCOUNTER — APPOINTMENT (OUTPATIENT)
Dept: LAB | Facility: CLINIC | Age: 77
End: 2017-11-17
Payer: MEDICARE

## 2017-11-17 ENCOUNTER — TRANSCRIBE ORDERS (OUTPATIENT)
Dept: RADIOLOGY | Facility: CLINIC | Age: 77
End: 2017-11-17

## 2017-11-17 DIAGNOSIS — I10 ESSENTIAL (PRIMARY) HYPERTENSION: ICD-10-CM

## 2017-11-17 DIAGNOSIS — E11.9 TYPE 2 DIABETES MELLITUS WITHOUT COMPLICATIONS (HCC): ICD-10-CM

## 2017-11-17 LAB
ALBUMIN SERPL BCP-MCNC: 3.8 G/DL (ref 3.5–5)
ALP SERPL-CCNC: 72 U/L (ref 46–116)
ALT SERPL W P-5'-P-CCNC: 35 U/L (ref 12–78)
ANION GAP SERPL CALCULATED.3IONS-SCNC: 10 MMOL/L (ref 4–13)
AST SERPL W P-5'-P-CCNC: 30 U/L (ref 5–45)
BILIRUB SERPL-MCNC: 0.57 MG/DL (ref 0.2–1)
BUN SERPL-MCNC: 16 MG/DL (ref 5–25)
CALCIUM SERPL-MCNC: 8.8 MG/DL (ref 8.3–10.1)
CHLORIDE SERPL-SCNC: 97 MMOL/L (ref 100–108)
CHOLEST SERPL-MCNC: 143 MG/DL (ref 50–200)
CO2 SERPL-SCNC: 25 MMOL/L (ref 21–32)
CREAT SERPL-MCNC: 0.96 MG/DL (ref 0.6–1.3)
EST. AVERAGE GLUCOSE BLD GHB EST-MCNC: 303 MG/DL
GFR SERPL CREATININE-BSD FRML MDRD: 76 ML/MIN/1.73SQ M
GLUCOSE P FAST SERPL-MCNC: 349 MG/DL (ref 65–99)
HBA1C MFR BLD: 12.2 % (ref 4.2–6.3)
HDLC SERPL-MCNC: 43 MG/DL (ref 40–60)
LDLC SERPL CALC-MCNC: 66 MG/DL (ref 0–100)
POTASSIUM SERPL-SCNC: 3.9 MMOL/L (ref 3.5–5.3)
PROT SERPL-MCNC: 7.6 G/DL (ref 6.4–8.2)
SODIUM SERPL-SCNC: 132 MMOL/L (ref 136–145)
TRIGL SERPL-MCNC: 170 MG/DL
TSH SERPL DL<=0.05 MIU/L-ACNC: 1.38 UIU/ML (ref 0.36–3.74)

## 2017-11-17 PROCEDURE — 84443 ASSAY THYROID STIM HORMONE: CPT

## 2017-11-17 PROCEDURE — 80053 COMPREHEN METABOLIC PANEL: CPT

## 2017-11-17 PROCEDURE — 83036 HEMOGLOBIN GLYCOSYLATED A1C: CPT

## 2017-11-17 PROCEDURE — 80061 LIPID PANEL: CPT

## 2017-11-17 PROCEDURE — 36415 COLL VENOUS BLD VENIPUNCTURE: CPT

## 2017-11-20 ENCOUNTER — GENERIC CONVERSION - ENCOUNTER (OUTPATIENT)
Dept: OTHER | Facility: OTHER | Age: 77
End: 2017-11-20

## 2017-12-01 ENCOUNTER — ALLSCRIPTS OFFICE VISIT (OUTPATIENT)
Dept: OTHER | Facility: OTHER | Age: 77
End: 2017-12-01

## 2017-12-05 NOTE — PROGRESS NOTES
Assessment    1  Diabetes (250 00) (E11 9)   2  CVA (cerebrovascular accident) (434 91) (I63 9)   3  Atrial fibrillation (427 31) (I48 91)   4  Ulcerative colitis (556 9) (K51 90)   5  Hypertension (401 9) (I10)    Plan  Diabetes    · Januvia 100 MG Oral Tablet; TAKE 1 TABLET DAILY   · (1) COMPREHENSIVE METABOLIC PANEL; Status:Active; Requested for:61Tve9213;    · (1) HEMOGLOBIN A1C; Status:Active; Requested for:97Eax6154;    · (1) LIPID PANEL, FASTING; Status:Active; Requested for:05Axr7253;    · Follow-up visit in 4 Months Evaluation and Treatment  Follow-up  Status: Hold For -Scheduling  Requested for: 89UMF2269    Discussion/Summary  The patient, patient's family was counseled regarding diagnostic results,-- prognosis,-- importance of compliance with treatment  Possible side effects of new medications were reviewed with the patient/guardian today  The treatment plan was reviewed with the patient/guardian  The patient/guardian understands and agrees with the treatment plan      Chief Complaint  Pt in office today for a f/u on lab work  History of Present Illness  here for diabetes check, not checking his blood sugars regularly, eating anything   The patient presents with paroxysmal atrial fibrillation  The treatment strategy for this patient is rate control  He states his atrial fibrillation has been well controlled since the last visit  He has no significant interval events  Symptoms: The patient is currently asymptomatic  Medications: the patient is adherent with his medication regimen  -- He denies medication side effects  The patient is being seen for a routine clinic follow-up of cerebrovascular accident  The patient is currently asymptomatic  Current treatment includes antihypertensives and dyslipidemia medications  The patient is being seen for a routine clinic follow-up of ulcerative colitis  Current treatment includes azathioprine   By report, there is good compliance with treatment, good tolerance of treatment and good symptom control  The patient presents for follow-up of essential hypertension  The patient states he has been doing well with his blood pressure control since the last visit  He has no significant interval events  Symptoms: The patient is currently asymptomatic  Medications: the patient is adherent with his medication regimen  -- He denies medication side effects  Review of Systems   Constitutional: No fever or chills, feels well, no tiredness, no recent weight gain or weight loss  Eyes: No complaints of eye pain, no red eyes, no discharge from eyes, no itchy eyes  ENT: no complaints of earache, no hearing loss, no nosebleeds, no nasal discharge, no sore throat, no hoarseness  Cardiovascular: No complaints of slow heart rate, no fast heart rate, no chest pain, no palpitations, no leg claudication, no lower extremity  Respiratory: No complaints of shortness of breath, no wheezing, no cough, no SOB on exertion, no orthopnea or PND  Gastrointestinal: No complaints of abdominal pain, no constipation, no nausea or vomiting, no diarrhea or bloody stools  Genitourinary: nocturia  Musculoskeletal: No complaints of arthralgia, no myalgias, no joint swelling or stiffness, no limb pain or swelling  Integumentary: No complaints of skin rash or skin lesions, no itching, no skin wound, no dry skin  Neurological: No compliants of headache, no confusion, no convulsions, no numbness or tingling, no dizziness or fainting, no limb weakness, no difficulty walking  Psychiatric: Is not suicidal, no sleep disturbances, no anxiety or depression, no change in personality, no emotional problems  Endocrine: increased thirst   Hematologic/Lymphatic: No complaints of swollen glands, no swollen glands in the neck, does not bleed easily, no easy bruising  Active Problems  1  Abnormal stress test (794 39) (R94 39)   2  Anxiety (300 00) (F41 9)   3   Arteriosclerosis of coronary artery (414 00) (I25 10)   4  Atrial fibrillation (427 31) (I48 91)   5  Chronic anticoagulation (V58 61) (Z79 01)   6  CVA (cerebrovascular accident) (434 91) (I63 9)   7  Diabetes (250 00) (E11 9)   8  Diastolic dysfunction (228 3) (I51 9)   9  Encounter for special screening examination for genitourinary disorder (V81 6) (Z13 89)   10  Hyperlipidemia (272 4) (E78 5)   11  Hypertension (401 9) (I10)   12  Ischemic cardiomyopathy (414 8) (I25 5)   13  Medicare annual wellness visit, subsequent (V70 0) (Z00 00)   14  Memory loss (780 93) (R41 3)   15  Rectal bleeding (569 3) (K62 5)   16  S/P CABG (coronary artery bypass graft) (V45 81) (Z95 1)   17  Screening for neurological condition (V80 09) (Z13 89)   18  Seizure (780 39) (R56 9)   19  Shortness of breath on exertion (786 05) (R06 02)   20  Tiredness (780 79) (R53 83)   21  Ulcerative colitis (556 9) (K51 90)    Past Medical History  1  History of CVA (cerebral infarction) (434 91) (I63 9)   2  History of Epigastric pain (789 06) (R10 13)   3  History of Familial tremor (333 1) (G25 0)   4  History of diabetes mellitus (V12 29) (Z86 39)   5  History of EKG (V15 89) (Z92 89)   6  History of Hypercholesterolemia (272 0) (E78 00)    The active problems and past medical history were reviewed and updated today  Surgical History  1  History of CABG   2  History of Rotator Cuff Repair    The surgical history was reviewed and updated today  Family History  Mother    1  Family history of Lung cancer   2  Family history of Myocardial infarction  Father    3  Family history of Myocardial infarction    The family history was reviewed and updated today  Social History     · Former smoker (V54 40) (Q56 130)   · No alcohol use   · No drug use  The social history was reviewed and updated today  Current Meds   1  Apriso 0 375 GM Oral Capsule Extended Release 24 Hour; Therapy: 40DEH0936 to (Evaluate:75Rfa8448) Recorded   2   Atorvastatin Calcium 20 MG Oral Tablet; take 1 tablet by mouth once daily; Therapy: [facility]VWY7103 [facility]  Requested for: 68Unm3494; Last Rx:98Qqe3017 Ordered   3  AzaTHIOprine 50 MG Oral Tablet; Therapy: 50GNA9609 to (Evaluate:96Btd1713) Recorded   4  Donepezil HCl - 10 MG Oral Tablet; take 1 tablet every day; Therapy: 35Jrh4211 to (Evaluate:54Chv3993)  Requested for: 71YGQ3553; Last Rx:96Lrl8993 Ordered   5  Eliquis 2 5 MG Oral Tablet; take 1 tablet every twelve hours; Therapy: 54JHM9399 to (Ana Morton)  Requested for: 65KFQ4196; Last Rx:10Aio7517 Ordered   6  LevETIRAcetam 500 MG Oral Tablet; take 1 tablet by mouth twice a day; Therapy: 42Umq4126 to (Evaluate:99Jsy9934)  Requested for: 77OTY7866; Last Rx:50Clq0091 Ordered   7  LORazepam 0 5 MG Oral Tablet; take 1 tablet every 4 hours prn anxiety; Therapy: 36ZEY8554 to (Evaluate:01Mar2017); Last Rx:12Lso4489 Ordered   8  Pioglitazone HCl-Metformin HCl -  MG Oral Tablet; TAKE 1 TABLET Twice daily with food; Therapy: 93JCV8929 to (Jason Burrell)  Requested for: 49IUN1443; Last Rx:12Mra6334 Ordered   9  RisperiDONE 2 MG Oral Tablet; TAKE 1 TABLET AT BEDTIME; Therapy: 43OLO6292 to (Evaluate:32Ohf0213)  Requested for: 61HOR8639; Last Rx:11Wwi0877 Ordered   10  Sertraline HCl - 50 MG Oral Tablet; Take 1 tablet by mouth at bedtime; Therapy: 02XQH1922 to (Evaluate:03Zpn1169)  Requested for: 66Plp5470; Last  Rx:89Loy3106 Ordered    The medication list was reviewed and updated today  Allergies  1  No Known Drug Allergies    Vitals  Vital Signs    Recorded: 33QEG5896 01:15PM   Heart Rate 72   Systolic 886   Diastolic 70   Height 5 ft 5 in   Weight 217 lb    BMI Calculated 36 11   BSA Calculated 2 05   O2 Saturation 93       Physical Exam   Constitutional  General appearance: No acute distress, well appearing and well nourished  Ears, Nose, Mouth, and Throat  Oropharynx: Normal with no erythema, edema, exudate or lesions     Pulmonary  Auscultation of lungs: Clear to auscultation, equal breath sounds bilaterally, no wheezes, no rales, no rhonci  Cardiovascular  Auscultation of heart: Normal rate and rhythm, normal S1 and S2, without murmurs  Examination of extremities for edema and/or varicosities: Normal    Abdomen  Abdomen: Non-tender, no masses  Musculoskeletal  Gait and station: Normal    Psychiatric  Orientation to person, place and time: Abnormal   Mental Status: oriented to person  Mood and affect: Normal          Health Management  Diabetes   *VB - Eye Exam; every 1 year; Last 21SNP9251; Next Due: 17SWK5561; Overdue  Health Maintenance   Medicare Annual Wellness Visit; every 1 year; Last 17VLE3208; Next Due: 96CUN1374;  Overdue    Signatures   Electronically signed by : Ashlee Alexander DO; Dec  1 2017  1:41PM EST                       (Author)

## 2017-12-21 ENCOUNTER — LAB REQUISITION (OUTPATIENT)
Dept: LAB | Facility: HOSPITAL | Age: 77
End: 2017-12-21
Payer: MEDICARE

## 2017-12-21 ENCOUNTER — ALLSCRIPTS OFFICE VISIT (OUTPATIENT)
Dept: OTHER | Facility: OTHER | Age: 77
End: 2017-12-21

## 2017-12-21 DIAGNOSIS — R30.0 DYSURIA: ICD-10-CM

## 2017-12-21 LAB
CLARITY UR: NORMAL
COLOR UR: YELLOW
GLUCOSE (HISTORICAL): 4
HGB UR QL STRIP.AUTO: 3
KETONES UR STRIP-MCNC: NORMAL MG/DL
LEUKOCYTE ESTERASE UR QL STRIP: NORMAL
NITRITE UR QL STRIP: NORMAL
PH UR STRIP.AUTO: 9 [PH]
PROT UR STRIP-MCNC: NORMAL MG/DL
SP GR UR STRIP.AUTO: 1.02

## 2017-12-21 PROCEDURE — 87086 URINE CULTURE/COLONY COUNT: CPT | Performed by: FAMILY MEDICINE

## 2017-12-21 PROCEDURE — 87077 CULTURE AEROBIC IDENTIFY: CPT | Performed by: FAMILY MEDICINE

## 2017-12-21 PROCEDURE — 87186 SC STD MICRODIL/AGAR DIL: CPT | Performed by: FAMILY MEDICINE

## 2017-12-23 LAB — BACTERIA UR CULT: ABNORMAL

## 2018-01-13 VITALS
TEMPERATURE: 97.2 F | HEART RATE: 97 BPM | BODY MASS INDEX: 34.16 KG/M2 | OXYGEN SATURATION: 98 % | DIASTOLIC BLOOD PRESSURE: 78 MMHG | HEIGHT: 65 IN | WEIGHT: 205 LBS | SYSTOLIC BLOOD PRESSURE: 118 MMHG

## 2018-01-13 VITALS
WEIGHT: 203.5 LBS | SYSTOLIC BLOOD PRESSURE: 130 MMHG | BODY MASS INDEX: 33.91 KG/M2 | HEIGHT: 65 IN | DIASTOLIC BLOOD PRESSURE: 78 MMHG

## 2018-01-13 NOTE — MISCELLANEOUS
Assessment    1  Rectal bleeding (569 3) (K62 5)   2  Diabetes (250 00) (E11 9)   3  Atrial fibrillation (427 31) (I48 91)   4  Hyperlipidemia (272 4) (E78 5)   5  Hypertension (401 9) (I10)    Plan  Diabetes, Hyperlipidemia    · (1) COMPREHENSIVE METABOLIC PANEL; Status:Active; Requested for:07Mar2017;    Perform:[facility]; JUR:95UND0585; Ordered; For:Diabetes, Hyperlipidemia; Ordered By:Giovani Dixon;   · (1) HEMOGLOBIN A1C; Status:Active; Requested for:07Mar2017;    Perform:[facility]; NYD:06ZYB6553; Ordered; For:Diabetes, Hyperlipidemia; Ordered By:Giovani Dixon;   · (1) LIPID PANEL, FASTING; Status:Active; Requested for:07Mar2017;    Perform:[facility]; OJC:26MGH4308; Ordered; For:Diabetes, Hyperlipidemia; Ordered By:Mary Dixon;   · Follow-up visit in 4 Months Evaluation and Treatment  Follow-up  Status: Complete   Done: 16ROT9661   Ordered; For: Diabetes, Hyperlipidemia; Ordered By: Nuzhat Li Performed:  Due: 85RUJ9327; Last Updated By: Francesco Braden; 3/7/2017 10:40:21 AM  Encounter for special screening examination for genitourinary disorder    · *VB - Urinary Incontinence Screen (Dx Z13 89 Screen for UI); Status:Resulted - Requires  Verification,Retrospective By Protocol Authorization;   Done: 10MFC7267 10:06AM   Performed: In Office; Due:12Mar2017; Last Updated By:Anna Ramires; 3/7/2017 10:08:52 AM;Ordered;  For:Encounter for special screening examination for genitourinary disorder; Ordered By:Mary Dixon;  Screening for neurological condition    · *VB - Fall Risk Assessment  (Dx Z13 89 Screen for Neurologic Disorder);  Status:Resulted - Requires Verification,Retrospective By Protocol Authorization;   Done:  61GPW0362 10:08AM   Performed: In Office; Due:12Mar2017; Last Updated By:Anna Ramires; 3/7/2017 10:08:27 AM;Ordered;   For:Screening for neurological condition; Ordered By:Mary Dixon;    Discussion/Summary  Patient's Capacity to Self-Care: Patient is able to Self-Care  Counseling Documentation With Imm: The patient's family was counseled regarding diagnostic results, instructions for management, prognosis  Medication SE Review and Pt Understands Tx: Possible side effects of new medications were reviewed with the patient/guardian today  The treatment plan was reviewed with the patient/guardian  The patient/guardian understands and agrees with the treatment plan      Chief Complaint  Chief Complaint Free Text Note Form: KESHIA      History of Present Illness  TCM Communication [facility]: The patient is being contacted for follow-up after hospitalization  He was hospitalized at [facility]  The date of admission: 02/15/2017, date of discharge: 02/18/2017  He was discharged to home  Medications reviewed and updated today  He scheduled a follow up appointment  Symptoms: loose stools  The patient is currently experiencing symptoms  Counseling was provided to patient's family  Patient's wife  Communication performed and completed by Josiah Perkins       HPI: patient feeling better, less agitated, he was treated for a UTI and his agitation has improved  He is following up with   [facility], but the rectal bleeding has stopped  Review of Systems  Complete-Male:   Constitutional: No fever or chills, feels well, no tiredness, no recent weight gain or weight loss  Eyes: No complaints of eye pain, no red eyes, no discharge from eyes, no itchy eyes  ENT: no complaints of earache, no hearing loss, no nosebleeds, no nasal discharge, no sore throat, no hoarseness  Cardiovascular: No complaints of slow heart rate, no fast heart rate, no chest pain, no palpitations, no leg claudication, no lower extremity  Respiratory: No complaints of shortness of breath, no wheezing, no cough, no SOB on exertion, no orthopnea or PND  Gastrointestinal: No complaints of abdominal pain, no constipation, no nausea or vomiting, no diarrhea or bloody stools     Genitourinary: No complaints of dysuria, no incontinence, no hesitancy, no nocturia, no genital lesion, no testicular pain  Musculoskeletal: No complaints of arthralgia, no myalgias, no joint swelling or stiffness, no limb pain or swelling  Integumentary: No complaints of skin rash or skin lesions, no itching, no skin wound, no dry skin  Neurological: No compliants of headache, no confusion, no convulsions, no numbness or tingling, no dizziness or fainting, no limb weakness, no difficulty walking  Psychiatric: Is not suicidal, no sleep disturbances, no anxiety or depression, no change in personality, no emotional problems  Endocrine: No complaints of proptosis, no hot flashes, no muscle weakness, no erectile dysfunction, no deepening of the voice, no feelings of weakness  Hematologic/Lymphatic: No complaints of swollen glands, no swollen glands in the neck, does not bleed easily, no easy bruising  Active Problems     1  Abnormal stress test (794 39) (R94 39)   2  Anxiety (300 00) (F41 9)   3  Arteriosclerosis of coronary artery (414 00) (I25 10)   4  Atrial fibrillation (427 31) (I48 91)   5  Chronic anticoagulation (V58 61) (Z79 01)   6  CVA (cerebrovascular accident) (434 91) (I63 9)   7  Diabetes (250 00) (E11 9)   8  Diastolic dysfunction (495 4) (I51 9)   9  Hyperlipidemia (272 4) (E78 5)   10  Hypertension (401 9) (I10)   11  Ischemic cardiomyopathy (414 8) (I25 5)   12  Medicare annual wellness visit, subsequent (V70 0) (Z00 00)   13  Memory loss (780 93) (R41 3)   14  S/P CABG (coronary artery bypass graft) (V45 81) (Z95 1)   15  Seizure (780 39) (R56 9)   16  Shortness of breath on exertion (786 05) (R06 02)   17  Tiredness (780 79) (R53 83)   18  Ulcerative colitis (556 9) (K51 90)   19  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)    Rectal bleeding (569 3) (K62 5)          Past Medical History    1  History of CVA (cerebral infarction) (434 91) (I63 9)   2   History of Epigastric pain (789 06) (R10 13) 3  History of Familial tremor (333 1) (G25 0)   4  History of diabetes mellitus (V12 29) (Z86 39)   5  History of EKG (V15 89) (Z92 89)   6  History of Hypercholesterolemia (272 0) (E78 00)    Surgical History    1  History of CABG   2  History of Rotator Cuff Repair    Family History  Mother    1  Family history of Lung cancer   2  Family history of Myocardial infarction  Father    3  Family history of Myocardial infarction    Social History    · Former smoker (R51 07) (M53 567)   · No alcohol use   · No drug use    Current Meds   1  Apriso 0 375 GM Oral Capsule Extended Release 24 Hour; Therapy: 13MZT7158 to (Evaluate:80Kjb3224) Recorded   2  Atorvastatin Calcium 20 MG Oral Tablet; TAKE 1 TABLET DAILY; Therapy: 11CHX3550 to (Evaluate:28Edn2848)  Requested for: 01CSC6580; Last   Rx:17Jan2017 Ordered   3  AzaTHIOprine 50 MG Oral Tablet; Therapy: 72RFR4234 to (Evaluate:36Ejs3001) Recorded   4  Donepezil HCl - 10 MG Oral Tablet; take 1 tablet by mouth once daily; Therapy: 68Fem0383 to (Evaluate:16Apr2017)  Requested for: 88PNF9721; Last   Rx:48Asj4602 Ordered   5  Eliquis 2 5 MG Oral Tablet; take 1 tablet every twelve hours; Therapy: 25MZG6858 to (Evaluate:20Mar2017)  Requested for: 25EWB5688; Last   Rx:34Fld5468 Ordered   6  Gemfibrozil 600 MG Oral Tablet; TAKE 1 TABLET BY MOUTH TWICE A DAY WITH   MORNING AND EVENING MEAL; Therapy: 87DZM7320 to (([phone] [phone])  Requested for: 83Wge3378; Last   Rx:08Apr2016 Ordered   7  LevETIRAcetam 500 MG Oral Tablet; take 1 tablet by mouth twice a day; Therapy: 30Zdk9407 to (Evaluate:11Jan2017)  Requested for: 63Yio5635; Last   Rx:13Pdn0227 Ordered   8  Pioglitazone HCl-Metformin HCl -  MG Oral Tablet; take 1 tablet by mouth twice a   day with food; Therapy: 52ZRX2087 to (Evaluate:09Jan2017)  Requested for: 41ZXY2989; Last   Rx:15Jan2016 Ordered   9  RisperiDONE 0 5 MG Oral Tablet; TAKE ONE TABLET BY MOUTH AT BEDTIME;    Therapy: 79TSF0050 to (Evaluate:10Jun2017)  Requested for: 83JQH8250; Last   Rx:56Lca7469 Ordered   10  Sertraline HCl - 50 MG Oral Tablet; Take 1 tablet by mouth at bedtime; Therapy: 21SAM6202 to (Evaluate:12Apr2017)  Requested for: 14Oct2016; Last    Rx:06Jlo1443 Ordered    Allergies    1  No Known Drug Allergies    Vitals  Signs   Recorded: 46PUY3638 10:03AM   Heart Rate: 80  Systolic: 935  Diastolic: 66  Height: 5 ft 5 in  Weight: 207 lb 8 0 oz  BMI Calculated: 34 53  BSA Calculated: 2 01  O2 Saturation: 97    Physical Exam    Constitutional   General appearance: No acute distress, well appearing and well nourished  Ears, Nose, Mouth, and Throat   Nasal mucosa, septum, and turbinates: Normal without edema or erythema  Oropharynx: Normal with no erythema, edema, exudate or lesions  Pulmonary   Auscultation of lungs: Clear to auscultation, equal breath sounds bilaterally, no wheezes, no rales, no rhonci  Cardiovascular   Auscultation of heart: Normal rate and rhythm, normal S1 and S2, without murmurs  Examination of extremities for edema and/or varicosities: Normal     Musculoskeletal   Gait and station: Normal     Neurologic   Cranial nerves: Cranial nerves 2-12 intact  Psychiatric   Orientation to person, place and time: Abnormal   Mental Status: disoriented to place, disoriented to time, difficulty naming common objects, inadequate knowledge of current events and inadequate knowledge regarding past history, but oriented to person  Results/Data  *VB - Fall Risk Assessment  (Dx Z13 89 Screen for Neurologic Disorder) 85MDU2985 10:08AM Tamir Angelessalvador     Test Name Result Flag Reference   Falls Risk      1 fall without injury in the past year                       *VB - Urinary Incontinence Screen (Dx Z13 89 Screen for UI) 54ZRN5411 10:06AM Giovani Dixon   Pt  reports no UI  He has a UTI so he is urinating more frequently at the time       Test Name Result Flag Reference   Urinary Incontinence Assessment 09AQZ1447                           Health Management  Diabetes   *VB - Eye Exam; every 1 year; Last 01LVA2545; Next Due: 21WLQ7617; Near Due  Health Maintenance   Medicare Annual Wellness Visit; every 1 year; Last 54ZYA4973; Next Due: 38PJU1207; Overdue    Future Appointments    Date/Time Provider Specialty Site   03/14/2017 07:45 AM KEVIN Foley   [facility] [place] [address] OUTPATIENT     Signatures   Electronically signed by : Chente Mcdaniels DO; Mar  7 2017 10:46AM EST                       (Author)

## 2018-01-14 VITALS
BODY MASS INDEX: 34.57 KG/M2 | SYSTOLIC BLOOD PRESSURE: 130 MMHG | DIASTOLIC BLOOD PRESSURE: 66 MMHG | OXYGEN SATURATION: 97 % | WEIGHT: 207.5 LBS | HEART RATE: 80 BPM | HEIGHT: 65 IN

## 2018-01-15 NOTE — RESULT NOTES
Message   call, blood sugar elevated, otherwise ok     Verified Results  (1) COMPREHENSIVE METABOLIC PANEL 60TXA9442 56:21ZJ [facility] Order Number: [id number]  TW Order Number: [id number] Order Number: [id number] Order Number: [id number] Order Number: [id number]     Test Name Result Flag Reference   GLUCOSE,RANDM 290 mg/dL H    If the patient is fasting, the ADA then defines impaired fasting glucose as > 100 mg/dL and diabetes as > or equal to 123 mg/dL  SODIUM 135 mmol/L L 136-145   POTASSIUM 4 0 mmol/L  3 5-5 3   CHLORIDE 101 mmol/L  100-108   CARBON DIOXIDE 26 mmol/L  21-32   ANION GAP (CALC) 8 mmol/L  4-13   BLOOD UREA NITROGEN 15 mg/dL  5-25   CREATININE 0 99 mg/dL  0 60-1 30   Standardized to IDMS reference method   CALCIUM 9 0 mg/dL  8 3-10 1   BILI, TOTAL 0 51 mg/dL  0 20-1 00   ALK PHOSPHATAS 79 U/L     ALT (SGPT) 15 U/L  12-78   AST(SGOT) 13 U/L  5-45   ALBUMIN 3 7 g/dL  3 5-5 0   TOTAL PROTEIN 7 6 g/dL  6 4-8 2   eGFR Non-African American      >60 0 ml/min/1 73sq [facility] Disease Education Program recommendations are as follows:  GFR calculation is accurate only with a steady state creatinine  Chronic Kidney disease less than 60 ml/min/1 73 sq  meters  Kidney failure less than 15 ml/min/1 73 sq  meters  (1) HEMOGLOBIN A1C 01DKR3360 08:44AM [facility] Order Number: [id number]  TW Order Number: [id number]     Test Name Result Flag Reference   HEMOGLOBIN A1C 9 1 % H 4 2-6 3   EST  AVG  GLUCOSE 214 mg/dl       (1) LIPID PANEL, FASTING 52QUX5954 08:44AM [facility] Order Number: [id number]  TW Order Number: [id number] Order Number: [id number] Order Number: [id number] Order Number: [id number]     Test Name Result Flag Reference   CHOLESTEROL 176 mg/dL     HDL,DIRECT 48 mg/dL  40-60   Specimen collection should occur prior to Metamizole administration due to the potential for falsely depressed results     LDL CHOLESTEROL CALCULATED 97 mg/dL  0-100   Triglyceride:         Normal              <150 mg/dl       Borderline High    150-199 mg/dl       High               200-499 mg/dl       Very High          >499 mg/dl  Cholesterol:         Desirable        <200 mg/dl      Borderline High  200-239 mg/dl      High             >239 mg/dl  HDL Cholesterol:        High    >59 mg/dL      Low     <41 mg/dL  LDL CALCULATED:    This screening LDL is a calculated result  It does not have the accuracy of the Direct Measured LDL in the monitoring of patients with hyperlipidemia and/or statin therapy  Direct Measure LDL (THT752) must be ordered separately in these patients  TRIGLYCERIDES 156 mg/dL H <=150   Specimen collection should occur prior to N-Acetylcysteine or Metamizole administration due to the potential for falsely depressed results       (1) VITAMIN B12 83VRY0800 08:44AM Ryan Weiss Order Number: [id number]   Order Number: [id number] Order Number: [id number] Order Number: [id number] Order Number: [id number]     Test Name Result Flag Reference   VITAMIN B12 282 pg/mL  100-900     (1) FOLATE 43FZG6875 08:44AM Ryan Weiss Order Number: [id number]  TW Order Number: [id number] Order Number: [id number] Order Number: [id number] Order Number: [id number]     Test Name Result Flag Reference   FOLATE 6 6 ng/mL  3 1-17 5

## 2018-01-16 NOTE — RESULT NOTES
Message   call, blood sugars still high, make follow-up appt to discuss further treatment options     Verified Results  (1) COMPREHENSIVE METABOLIC PANEL [id number] 82:06TA Almita Portillo Kidney Disease Education Program recommendations are as follows:  GFR calculation is accurate only with a steady state creatinine  Chronic Kidney disease less than 60 ml/min/1 73 sq  meters  Kidney failure less than 15 ml/min/1 73 sq  meters  Test Name Result Flag Reference   GLUCOSE,RANDM 210 mg/dL H    SODIUM 139 mmol/L  136-145   POTASSIUM 4 4 mmol/L  3 5-5 3   CHLORIDE 103 mmol/L  100-108   CARBON DIOXIDE 27 mmol/L  21-32   ANION GAP (CALC) 9 mmol/L  4-13   BLOOD UREA NITROGEN 13 mg/dL  5-25   CREATININE 1 04 mg/dL  0 60-1 30   Standardized to IDMS reference method   CALCIUM 8 7 mg/dL  8 3-10 1   BILI, TOTAL 0 57 mg/dL  0 20-1 00   ALK PHOSPHATAS 67 U/L     ALT (SGPT) 10 U/L L 12-78   AST(SGOT) 17 U/L  5-45   ALBUMIN 3 8 g/dL  3 5-5 0   TOTAL PROTEIN 7 2 g/dL  6 4-8 2   eGFR Non-African American      >60 0 ml/min/1 73sq m     (1) LIPID PANEL, FASTING [id number] 02:21PM Giovani Dixon   Triglyceride:         Normal              <150 mg/dl       Borderline High    150-199 mg/dl       High               200-499 mg/dl       Very High          >499 mg/dl  Cholesterol:         Desirable        <200 mg/dl      Borderline High  200-239 mg/dl      High             >239 mg/dl  HDL Cholesterol:        High    >59 mg/dL      Low     <41 mg/dL  LDL CALCULATED:    This screening LDL is a calculated result  It does not have the accuracy of the Direct Measured LDL in the monitoring of patients with hyperlipidemia and/or statin therapy  Direct Measure LDL (DFM677) must be ordered separately in these patients       Test Name Result Flag Reference   CHOLESTEROL 163 mg/dL     HDL,DIRECT 45 mg/dL  40-60   LDL CHOLESTEROL CALCULATED 95 mg/dL  0-100   TRIGLYCERIDES 117 mg/dL  <=150     (1) HEMOGLOBIN A1C [id number] 01: 44PM Giovani Dixon   5 7-6 4% impaired fasting glucose  >=6 5% diagnosis of diabetes    Falsely low levels are seen in conditions linked to short RBC life span-  hemolytic anemia, and splenomegaly  Falsely elevated levels are seen in situations where there is an increased production of RBC- receipt of erythropoietin or blood transfusions  Adopted from ADA-Clinical Practice Recommendations     Test Name Result Flag Reference   HEMOGLOBIN A1C 8 8 % H 4 0-5 6   EST  AVG   GLUCOSE 206 mg/dl

## 2018-01-16 NOTE — RESULT NOTES
Verified Results  (1) COMPREHENSIVE METABOLIC PANEL [id number] 37:41DS Tami Rodriguez Order Number: [id number]     Test Name Result Flag Reference   SODIUM 132 mmol/L L 136-145   POTASSIUM 3 9 mmol/L  3 5-5 3   CHLORIDE 97 mmol/L L 100-108   CARBON DIOXIDE 25 mmol/L  21-32   ANION GAP (CALC) 10 mmol/L  4-13   BLOOD UREA NITROGEN 16 mg/dL  5-25   CREATININE 0 96 mg/dL  0 60-1 30   Standardized to IDMS reference method   CALCIUM 8 8 mg/dL  8 3-10 1   BILI, TOTAL 0 57 mg/dL  0 20-1 00   ALK PHOSPHATAS 72 U/L     ALT (SGPT) 35 U/L  12-78   Specimen collection should occur prior to Sulfasalazine and/or Sulfapyridine administration due to the potential for falsely depressed results  AST(SGOT) 30 U/L  5-45   Specimen collection should occur prior to Sulfasalazine administration due to the potential for falsely depressed results  ALBUMIN 3 8 g/dL  3 5-5 0   TOTAL PROTEIN 7 6 g/dL  6 4-8 2   eGFR 76 ml/min/1 73sq m     National Kidney Disease Education Program recommendations are as follows:  GFR calculation is accurate only with a steady state creatinine  Chronic Kidney disease less than 60 ml/min/1 73 sq  meters  Kidney failure less than 15 ml/min/1 73 sq  meters  GLUCOSE FASTING 349 mg/dL H 65-99   Specimen collection should occur prior to Sulfasalazine administration due to the potential for falsely depressed results  Specimen collection should occur prior to Sulfapyridine administration due to the potential for falsely elevated results  (1) HEMOGLOBIN A1C [id number] 08:41AM Tami Rodriguez Order Number: [id number]     Test Name Result Flag Reference   HEMOGLOBIN A1C 12 2 % H 4 2-6 3   EST  AVG   GLUCOSE 303 mg/dl       (1) LIPID PANEL, FASTING [id number] 08:41AM Tami Rodriguez Order Number: [id number]     Test Name Result Flag Reference   CHOLESTEROL 143 mg/dL     HDL,DIRECT 43 mg/dL  40-60   Specimen collection should occur prior to Metamizole administration due to the potential for falsley depressed results  LDL CHOLESTEROL CALCULATED 66 mg/dL  0-100   Triglyceride:        Normal <150 mg/dl   Borderline High 150-199 mg/dl   High 200-499 mg/dl   Very High >499 mg/dl      Cholesterol:       Desirable <200 mg/dl    Borderline High 200-239 mg/dl    High >239 mg/dl      HDL Cholesterol:       High>59 mg/dL    Low <41 mg/dL      This screening LDL is a calculated result  It does not have the accuracy of the Direct Measured LDL in the monitoring of patients with hyperlipidemia and/or statin therapy  Direct Measure LDL (ULP072) must be ordered separately in these patients  TRIGLYCERIDES 170 mg/dL H <=150   Specimen collection should occur prior to N-Acetylcysteine or Metamizole administration due to the potential for falsely depressed results  (1) TSH [id number] 08:41AM Lili Cervantes Order Number: [id number]     Test Name Result Flag Reference   TSH 1 380 uIU/mL  0 358-3 740   Patients undergoing fluorescein dye angiography may retain small amounts of fluorescein in the body for 48-72 hours post procedure  Samples containing fluorescein can produce falsely depressed TSH values  If the patient had this procedure,a specimen should be resubmitted post fluorescein clearance

## 2018-01-22 VITALS
OXYGEN SATURATION: 93 % | WEIGHT: 217 LBS | BODY MASS INDEX: 36.15 KG/M2 | SYSTOLIC BLOOD PRESSURE: 124 MMHG | DIASTOLIC BLOOD PRESSURE: 70 MMHG | HEART RATE: 72 BPM | HEIGHT: 65 IN

## 2018-01-23 NOTE — PROGRESS NOTES
Chief Complaint  Patient urine was brought in for POC testing  No symptoms were reported  Dipstick test was performed in office and resulted  Active Problems    1  Abnormal stress test (794 39) (R94 39)   2  Anxiety (300 00) (F41 9)   3  Arteriosclerosis of coronary artery (414 00) (I25 10)   4  Atrial fibrillation (427 31) (I48 91)   5  Chronic anticoagulation (V58 61) (Z79 01)   6  CVA (cerebrovascular accident) (434 91) (I63 9)   7  Diabetes (250 00) (E11 9)   8  Diastolic dysfunction (179 1) (I51 9)   9  Encounter for special screening examination for genitourinary disorder (V81 6) (Z13 89)   10  Hyperlipidemia (272 4) (E78 5)   11  Hypertension (401 9) (I10)   12  Ischemic cardiomyopathy (414 8) (I25 5)   13  Medicare annual wellness visit, subsequent (V70 0) (Z00 00)   14  Memory loss (780 93) (R41 3)   15  Rectal bleeding (569 3) (K62 5)   16  S/P CABG (coronary artery bypass graft) (V45 81) (Z95 1)   17  Screening for neurological condition (V80 09) (Z13 89)   18  Seizure (780 39) (R56 9)   19  Shortness of breath on exertion (786 05) (R06 02)   20  Tiredness (780 79) (R53 83)   21  Ulcerative colitis (556 9) (K51 90)    Current Meds   1  Apriso 0 375 GM Oral Capsule Extended Release 24 Hour; Therapy: 30SFD7894 to (Evaluate:40Frs9019) Recorded   2  Atorvastatin Calcium 20 MG Oral Tablet; take 1 tablet by mouth once daily; Therapy: 76FHF3401 to (ISQQORAS:62XWT8686)  Requested for: 09Rym8522; Last   Rx:66Cem9433 Ordered   3  AzaTHIOprine 50 MG Oral Tablet; Therapy: 09HMV4336 to (Evaluate:08Ins3337) Recorded   4  Donepezil HCl - 10 MG Oral Tablet; take 1 tablet every day; Therapy: 16Tsa6259 to (Evaluate:81Pzt7516)  Requested for: 87OZZ4549; Last   Rx:72Bwi7634 Ordered   5  Eliquis 2 5 MG Oral Tablet; take 1 tablet every twelve hours; Therapy: 58YBV8576 to (Naomy Weiner)  Requested for: 52SLS5250; Last   Rx:68Cqp3071 Ordered   6  Januvia 100 MG Oral Tablet; TAKE 1 TABLET DAILY;    Therapy: 22WHP6275 to (Evaluate:20Ukb1395)  Requested for: 01KMW1435; Last   Rx:06Uaa4081 Ordered   7  LevETIRAcetam 500 MG Oral Tablet; take 1 tablet by mouth twice a day; Therapy: 30Dva3615 to (Evaluate:14Beg2321)  Requested for: 05DHJ9005; Last   Rx:83Egj5175 Ordered   8  LORazepam 0 5 MG Oral Tablet; take 1 tablet every 4 hours prn anxiety; Therapy: 33ZWT0446 to (Evaluate:01Mar2017); Last Rx:45Ptf6812 Ordered   9  Pioglitazone HCl-Metformin HCl -  MG Oral Tablet; TAKE 1 TABLET Twice daily   with food; Therapy: 45UBQ1130 to ([facility])  Requested for: 59PGJ6781; Last   Rx:13Mar2017 Ordered   10  RisperiDONE 2 MG Oral Tablet; TAKE 1 TABLET AT BEDTIME; Therapy: 61PTD1572 to (Evaluate:06Jan2018)  Requested for: 06SKC7752; Last    Rx:99Lfp4510 Ordered   11  Sertraline HCl - 50 MG Oral Tablet; Take 1 tablet by mouth at bedtime; Therapy: 20AOL7600 to (Evaluate:17Jun2018)  Requested for: 95EWI6692; Last    Rx:40Mvb4957 Ordered    Allergies    1   No Known Drug Allergies    Signatures   Electronically signed by : KEVIN Piper ; Dec 21 2017 12:45PM EST

## 2018-01-26 DIAGNOSIS — E78.2 MIXED HYPERLIPIDEMIA: Primary | ICD-10-CM

## 2018-01-26 RX ORDER — ATORVASTATIN CALCIUM 20 MG/1
TABLET, FILM COATED ORAL
Qty: 30 TABLET | Refills: 5 | OUTPATIENT
Start: 2018-01-26

## 2018-01-26 RX ORDER — ATORVASTATIN CALCIUM 20 MG/1
20 TABLET, FILM COATED ORAL DAILY
Qty: 30 TABLET | Refills: 5 | Status: SHIPPED | OUTPATIENT
Start: 2018-01-26

## 2018-01-29 ENCOUNTER — TELEPHONE (OUTPATIENT)
Dept: FAMILY MEDICINE CLINIC | Facility: CLINIC | Age: 78
End: 2018-01-29

## 2018-01-29 DIAGNOSIS — R56.9 SEIZURE (HCC): Primary | ICD-10-CM

## 2018-01-29 RX ORDER — LEVETIRACETAM 500 MG/1
TABLET ORAL
Qty: 60 TABLET | Refills: 3 | Status: SHIPPED | OUTPATIENT
Start: 2018-01-29

## 2018-02-15 ENCOUNTER — OFFICE VISIT (OUTPATIENT)
Dept: FAMILY MEDICINE CLINIC | Facility: CLINIC | Age: 78
End: 2018-02-15
Payer: MEDICARE

## 2018-02-15 VITALS
HEIGHT: 66 IN | DIASTOLIC BLOOD PRESSURE: 70 MMHG | OXYGEN SATURATION: 98 % | HEART RATE: 77 BPM | SYSTOLIC BLOOD PRESSURE: 110 MMHG

## 2018-02-15 DIAGNOSIS — R30.0 DYSURIA: Primary | ICD-10-CM

## 2018-02-15 DIAGNOSIS — R23.8 VESICULAR RASH: ICD-10-CM

## 2018-02-15 DIAGNOSIS — L03.213 PERIORBITAL CELLULITIS OF LEFT EYE: ICD-10-CM

## 2018-02-15 DIAGNOSIS — N30.01 ACUTE CYSTITIS WITH HEMATURIA: ICD-10-CM

## 2018-02-15 LAB
SL AMB  POCT GLUCOSE, UA: ABNORMAL
SL AMB LEUKOCYTE ESTERASE,UA: ABNORMAL
SL AMB POCT BLOOD,UA: 3
SL AMB POCT CLARITY,UA: ABNORMAL
SL AMB POCT COLOR,UA: YELLOW
SL AMB POCT KETONES,UA: ABNORMAL
SL AMB POCT NITRITE,UA: POSITIVE
SL AMB POCT PH,UA: 6
SL AMB POCT SPECIFIC GRAVITY,UA: 1.01
SL AMB POCT URINE PROTEIN: 1

## 2018-02-15 PROCEDURE — 87077 CULTURE AEROBIC IDENTIFY: CPT | Performed by: PHYSICIAN ASSISTANT

## 2018-02-15 PROCEDURE — 81002 URINALYSIS NONAUTO W/O SCOPE: CPT | Performed by: PHYSICIAN ASSISTANT

## 2018-02-15 PROCEDURE — 87186 SC STD MICRODIL/AGAR DIL: CPT | Performed by: PHYSICIAN ASSISTANT

## 2018-02-15 PROCEDURE — 99213 OFFICE O/P EST LOW 20 MIN: CPT | Performed by: PHYSICIAN ASSISTANT

## 2018-02-15 PROCEDURE — 87086 URINE CULTURE/COLONY COUNT: CPT | Performed by: PHYSICIAN ASSISTANT

## 2018-02-15 RX ORDER — CEPHALEXIN 500 MG/1
500 CAPSULE ORAL EVERY 6 HOURS SCHEDULED
Qty: 40 CAPSULE | Refills: 0 | Status: SHIPPED | OUTPATIENT
Start: 2018-02-15 | End: 2018-02-25

## 2018-02-15 NOTE — PATIENT INSTRUCTIONS
Urine Chem strip very suggestive for urinary tract infection  Patient with skin infection around his eyes  For both of these will send and Keflex 500 mg twice a day for 10 days  His urine is sent for culture

## 2018-02-15 NOTE — PROGRESS NOTES
Assessment/Plan:       Diagnoses and all orders for this visit:    Dysuria  -     POCT urine dip  -     Urine culture; Future  -     Urine culture    Acute cystitis with hematuria  -     cephalexin (KEFLEX) 500 mg capsule; Take 1 capsule (500 mg total) by mouth every 6 (six) hours for 10 days    Vesicular rash    Periorbital cellulitis of left eye  -     cephalexin (KEFLEX) 500 mg capsule; Take 1 capsule (500 mg total) by mouth every 6 (six) hours for 10 days            Subjective:      Patient ID: Lion Hartley is a 65 y o  male  Patient is here with his wife  His wife notes that he is constantly urinating  He feels like he has to go on does not go very much  She has noted that his urine has been pinkish in color and a darker color at times also  Urinary Frequency    This is a new problem  The current episode started yesterday  The problem has been unchanged  The patient is experiencing no pain  There has been no fever  There is no history of pyelonephritis  Associated symptoms include frequency, hematuria and urgency  He has tried nothing for the symptoms  Rash   This is a new problem  The current episode started in the past 7 days  The problem has been gradually improving since onset  The affected locations include the face  The rash is characterized by blistering and draining  He was exposed to nothing  Past treatments include antibiotic cream  The treatment provided moderate relief  The following portions of the patient's history were reviewed and updated as appropriate:   He has a past medical history of Anxiety; Dementia; Diabetes mellitus ([address] ); Hyperlipidemia; Hypertension; Polio; Psychiatric disorder; Seizures ([facility] [address] ); and Stroke ([facility] [address] )  ,   does not have any pertinent problems on file  ,   has a past surgical history that includes Appendectomy; Cardiac surgery; Rotator cuff repair (Left); and pr esophagogastroduodenoscopy transoral diagnostic (N/A, 3/21/2017)  ,  family history is not on file ,   reports that he has quit smoking  He has never used smokeless tobacco  He reports that he does not drink alcohol or use drugs  ,  has No Known Allergies     Current Outpatient Prescriptions   Medication Sig Dispense Refill    apixaban (ELIQUIS) 2 5 mg Take 2 5 mg by mouth 2 (two) times a day      atorvastatin (LIPITOR) 20 mg tablet Take 1 tablet by mouth daily 30 tablet 5    azaTHIOprine (IMURAN) 50 mg tablet Take 50 mg by mouth daily      donepezil (ARICEPT) 10 mg tablet Take 10 mg by mouth daily at bedtime      levETIRAcetam (KEPPRA) 500 mg tablet take 1 tablet by mouth twice a day 60 tablet 3    mesalamine (APRISO) 0 375 G 24 hr capsule Take 375 mg by mouth daily      pioglitazone-metFORMIN (ACTOPLUS MET)  MG per tablet Take 1 tablet by mouth 2 (two) times a day with meals      risperiDONE (RisperDAL) 2 mg tablet   0    sertraline (ZOLOFT) 50 mg tablet Take 50 mg by mouth daily at bedtime      cephalexin (KEFLEX) 500 mg capsule Take 1 capsule (500 mg total) by mouth every 6 (six) hours for 10 days 40 capsule 0    gemfibrozil (LOPID) 600 mg tablet Take 600 mg by mouth 2 (two) times a day before meals      hydrocortisone (ANUSOL-HC) 25 mg suppository Insert 1 suppository into the rectum 2 (two) times a day for 30 days 60 suppository 0    lisinopril (ZESTRIL) 10 mg tablet Take 1 tablet by mouth daily for 30 days 30 tablet 0    LORazepam (ATIVAN) 0 5 mg tablet Take 1 tablet by mouth every 8 (eight) hours as needed for anxiety for up to 4 days 10 tablet 0    mesalamine (APRISO) 0 375 g 24 hr capsule Take by mouth      risperiDONE (RisperDAL) 0 5 mg tablet Take 0 5 mg by mouth daily at bedtime      sitaGLIPtin (JANUVIA) 100 mg tablet Take 1 tablet by mouth daily       No current facility-administered medications for this visit  Review of Systems   Eyes: Negative for discharge, redness and itching  Genitourinary: Positive for frequency, hematuria and urgency   Negative for difficulty urinating and dysuria  Skin: Positive for rash  Objective:  Vitals:    02/15/18 1110   BP: 110/70   Pulse: 77   SpO2: 98%      Physical Exam   HENT:   Head: Normocephalic  Eyes: Right eye exhibits no discharge  Left eye exhibits no discharge  Right conjunctiva is not injected  Left conjunctiva is not injected  Patient with bilateral lower lid lesions with some honey crust type material on them   Neck: Normal range of motion  Abdominal: Bowel sounds are normal  There is no tenderness  Skin: Skin is warm and dry

## 2018-02-18 LAB — BACTERIA UR CULT: ABNORMAL

## 2018-03-01 ENCOUNTER — TELEPHONE (OUTPATIENT)
Dept: FAMILY MEDICINE CLINIC | Facility: CLINIC | Age: 78
End: 2018-03-01

## 2018-03-01 DIAGNOSIS — N30.00 ACUTE CYSTITIS WITHOUT HEMATURIA: Primary | ICD-10-CM

## 2018-03-01 RX ORDER — NITROFURANTOIN MACROCRYSTALS 100 MG/1
100 CAPSULE ORAL 2 TIMES DAILY
Qty: 10 CAPSULE | Refills: 0 | Status: SHIPPED | OUTPATIENT
Start: 2018-03-01 | End: 2018-03-06

## 2018-03-05 ENCOUNTER — TELEPHONE (OUTPATIENT)
Dept: FAMILY MEDICINE CLINIC | Facility: CLINIC | Age: 78
End: 2018-03-05

## 2018-03-05 DIAGNOSIS — F41.9 ANXIETY: Primary | ICD-10-CM

## 2018-03-05 RX ORDER — LORAZEPAM 0.5 MG/1
0.5 TABLET ORAL EVERY 8 HOURS PRN
Qty: 10 TABLET | Refills: 0 | Status: CANCELLED | OUTPATIENT
Start: 2018-03-05 | End: 2018-03-09

## 2018-03-05 RX ORDER — LORAZEPAM 0.5 MG/1
0.5 TABLET ORAL EVERY 8 HOURS PRN
Qty: 30 TABLET | Refills: 0 | Status: SHIPPED | OUTPATIENT
Start: 2018-03-05 | End: 2018-03-16 | Stop reason: HOSPADM

## 2018-03-10 ENCOUNTER — HOSPITAL ENCOUNTER (INPATIENT)
Facility: HOSPITAL | Age: 78
LOS: 6 days | Discharge: NURSING, REHAB OR LONG-TERM CARE | DRG: 637 | End: 2018-03-16
Attending: EMERGENCY MEDICINE | Admitting: FAMILY MEDICINE
Payer: MEDICARE

## 2018-03-10 ENCOUNTER — APPOINTMENT (EMERGENCY)
Dept: CT IMAGING | Facility: HOSPITAL | Age: 78
DRG: 637 | End: 2018-03-10
Payer: MEDICARE

## 2018-03-10 DIAGNOSIS — I10 ESSENTIAL HYPERTENSION: Chronic | ICD-10-CM

## 2018-03-10 DIAGNOSIS — I48.91 ATRIAL FIBRILLATION, UNSPECIFIED TYPE (HCC): Chronic | ICD-10-CM

## 2018-03-10 DIAGNOSIS — F41.9 ANXIETY: ICD-10-CM

## 2018-03-10 DIAGNOSIS — E11.9 TYPE 2 DIABETES MELLITUS (HCC): Chronic | ICD-10-CM

## 2018-03-10 DIAGNOSIS — E11.65 TYPE 2 DIABETES MELLITUS WITH HYPERGLYCEMIA, WITHOUT LONG-TERM CURRENT USE OF INSULIN (HCC): Chronic | ICD-10-CM

## 2018-03-10 DIAGNOSIS — R53.1 WEAKNESS: ICD-10-CM

## 2018-03-10 DIAGNOSIS — N39.0 UTI (URINARY TRACT INFECTION): Primary | ICD-10-CM

## 2018-03-10 DIAGNOSIS — R52 PAIN: ICD-10-CM

## 2018-03-10 DIAGNOSIS — N40.0 PROSTATIC HYPERPLASIA: ICD-10-CM

## 2018-03-10 DIAGNOSIS — F03.90 DEMENTIA (HCC): ICD-10-CM

## 2018-03-10 DIAGNOSIS — N30.01 ACUTE CYSTITIS WITH HEMATURIA: ICD-10-CM

## 2018-03-10 PROBLEM — Z86.73 HISTORY OF CVA IN ADULTHOOD: Chronic | Status: ACTIVE | Noted: 2018-03-10

## 2018-03-10 PROBLEM — I48.0 PAROXYSMAL ATRIAL FIBRILLATION (HCC): Chronic | Status: ACTIVE | Noted: 2017-02-15

## 2018-03-10 PROBLEM — I25.119 CORONARY ARTERY DISEASE INVOLVING NATIVE HEART WITH ANGINA PECTORIS (HCC): Status: ACTIVE | Noted: 2018-03-10

## 2018-03-10 PROBLEM — G40.909 SEIZURE DISORDER (HCC): Chronic | Status: ACTIVE | Noted: 2018-03-10

## 2018-03-10 PROBLEM — I69.320 APHASIA AS LATE EFFECT OF CEREBROVASCULAR ACCIDENT: Chronic | Status: ACTIVE | Noted: 2018-03-10

## 2018-03-10 LAB
ALBUMIN SERPL BCP-MCNC: 3.7 G/DL (ref 3.5–5)
ALP SERPL-CCNC: 72 U/L (ref 46–116)
ALT SERPL W P-5'-P-CCNC: 17 U/L (ref 12–78)
ANION GAP SERPL CALCULATED.3IONS-SCNC: 10 MMOL/L (ref 4–13)
AST SERPL W P-5'-P-CCNC: 13 U/L (ref 5–45)
BACTERIA UR QL AUTO: ABNORMAL /HPF
BASOPHILS # BLD AUTO: 0.01 THOUSANDS/ΜL (ref 0–0.1)
BASOPHILS NFR BLD AUTO: 0 % (ref 0–1)
BILIRUB DIRECT SERPL-MCNC: 0.18 MG/DL (ref 0–0.2)
BILIRUB SERPL-MCNC: 0.7 MG/DL (ref 0.2–1)
BILIRUB UR QL STRIP: NEGATIVE
BUN SERPL-MCNC: 12 MG/DL (ref 5–25)
CALCIUM SERPL-MCNC: 9.2 MG/DL (ref 8.3–10.1)
CHLORIDE SERPL-SCNC: 99 MMOL/L (ref 100–108)
CLARITY UR: ABNORMAL
CO2 SERPL-SCNC: 27 MMOL/L (ref 21–32)
COLOR UR: YELLOW
CREAT SERPL-MCNC: 0.87 MG/DL (ref 0.6–1.3)
EOSINOPHIL # BLD AUTO: 0 THOUSAND/ΜL (ref 0–0.61)
EOSINOPHIL NFR BLD AUTO: 0 % (ref 0–6)
ERYTHROCYTE [DISTWIDTH] IN BLOOD BY AUTOMATED COUNT: 14.6 % (ref 11.6–15.1)
GFR SERPL CREATININE-BSD FRML MDRD: 83 ML/MIN/1.73SQ M
GLUCOSE SERPL-MCNC: 210 MG/DL (ref 65–140)
GLUCOSE SERPL-MCNC: 236 MG/DL (ref 65–140)
GLUCOSE SERPL-MCNC: 291 MG/DL (ref 65–140)
GLUCOSE UR STRIP-MCNC: ABNORMAL MG/DL
HCT VFR BLD AUTO: 43.9 % (ref 36.5–49.3)
HGB BLD-MCNC: 14.4 G/DL (ref 12–17)
HGB UR QL STRIP.AUTO: ABNORMAL
KETONES UR STRIP-MCNC: ABNORMAL MG/DL
LACTATE SERPL-SCNC: 1.1 MMOL/L (ref 0.5–2)
LEUKOCYTE ESTERASE UR QL STRIP: ABNORMAL
LYMPHOCYTES # BLD AUTO: 0.52 THOUSANDS/ΜL (ref 0.6–4.47)
LYMPHOCYTES NFR BLD AUTO: 8 % (ref 14–44)
MAGNESIUM SERPL-MCNC: 1.8 MG/DL (ref 1.6–2.6)
MCH RBC QN AUTO: 29.1 PG (ref 26.8–34.3)
MCHC RBC AUTO-ENTMCNC: 32.8 G/DL (ref 31.4–37.4)
MCV RBC AUTO: 89 FL (ref 82–98)
MONOCYTES # BLD AUTO: 0.44 THOUSAND/ΜL (ref 0.17–1.22)
MONOCYTES NFR BLD AUTO: 7 % (ref 4–12)
NEUTROPHILS # BLD AUTO: 5.22 THOUSANDS/ΜL (ref 1.85–7.62)
NEUTS SEG NFR BLD AUTO: 84 % (ref 43–75)
NITRITE UR QL STRIP: NEGATIVE
NON-SQ EPI CELLS URNS QL MICRO: ABNORMAL /HPF
NRBC BLD AUTO-RTO: 0 /100 WBCS
PH UR STRIP.AUTO: 5.5 [PH] (ref 4.5–8)
PLATELET # BLD AUTO: 153 THOUSANDS/UL (ref 149–390)
PMV BLD AUTO: 10.8 FL (ref 8.9–12.7)
POTASSIUM SERPL-SCNC: 3.8 MMOL/L (ref 3.5–5.3)
PROT SERPL-MCNC: 7.6 G/DL (ref 6.4–8.2)
PROT UR STRIP-MCNC: ABNORMAL MG/DL
RBC # BLD AUTO: 4.94 MILLION/UL (ref 3.88–5.62)
RBC #/AREA URNS AUTO: ABNORMAL /HPF
SODIUM SERPL-SCNC: 136 MMOL/L (ref 136–145)
SP GR UR STRIP.AUTO: 1.02 (ref 1–1.03)
TROPONIN I SERPL-MCNC: <0.02 NG/ML
TROPONIN I SERPL-MCNC: <0.02 NG/ML
UROBILINOGEN UR QL STRIP.AUTO: 0.2 E.U./DL
WBC # BLD AUTO: 6.21 THOUSAND/UL (ref 4.31–10.16)
WBC #/AREA URNS AUTO: ABNORMAL /HPF

## 2018-03-10 PROCEDURE — 87040 BLOOD CULTURE FOR BACTERIA: CPT | Performed by: EMERGENCY MEDICINE

## 2018-03-10 PROCEDURE — 81001 URINALYSIS AUTO W/SCOPE: CPT | Performed by: EMERGENCY MEDICINE

## 2018-03-10 PROCEDURE — 84484 ASSAY OF TROPONIN QUANT: CPT | Performed by: INTERNAL MEDICINE

## 2018-03-10 PROCEDURE — 85025 COMPLETE CBC W/AUTO DIFF WBC: CPT | Performed by: EMERGENCY MEDICINE

## 2018-03-10 PROCEDURE — 83735 ASSAY OF MAGNESIUM: CPT | Performed by: INTERNAL MEDICINE

## 2018-03-10 PROCEDURE — 80177 DRUG SCRN QUAN LEVETIRACETAM: CPT | Performed by: INTERNAL MEDICINE

## 2018-03-10 PROCEDURE — 99285 EMERGENCY DEPT VISIT HI MDM: CPT

## 2018-03-10 PROCEDURE — 70450 CT HEAD/BRAIN W/O DYE: CPT

## 2018-03-10 PROCEDURE — 93005 ELECTROCARDIOGRAM TRACING: CPT

## 2018-03-10 PROCEDURE — 80076 HEPATIC FUNCTION PANEL: CPT | Performed by: EMERGENCY MEDICINE

## 2018-03-10 PROCEDURE — 82948 REAGENT STRIP/BLOOD GLUCOSE: CPT

## 2018-03-10 PROCEDURE — 0T9B70Z DRAINAGE OF BLADDER WITH DRAINAGE DEVICE, VIA NATURAL OR ARTIFICIAL OPENING: ICD-10-PCS | Performed by: EMERGENCY MEDICINE

## 2018-03-10 PROCEDURE — 80048 BASIC METABOLIC PNL TOTAL CA: CPT | Performed by: FAMILY MEDICINE

## 2018-03-10 PROCEDURE — 99223 1ST HOSP IP/OBS HIGH 75: CPT | Performed by: INTERNAL MEDICINE

## 2018-03-10 PROCEDURE — 36415 COLL VENOUS BLD VENIPUNCTURE: CPT | Performed by: EMERGENCY MEDICINE

## 2018-03-10 PROCEDURE — 96361 HYDRATE IV INFUSION ADD-ON: CPT

## 2018-03-10 PROCEDURE — 96360 HYDRATION IV INFUSION INIT: CPT

## 2018-03-10 PROCEDURE — 83605 ASSAY OF LACTIC ACID: CPT | Performed by: INTERNAL MEDICINE

## 2018-03-10 RX ORDER — SODIUM CHLORIDE 9 MG/ML
50 INJECTION, SOLUTION INTRAVENOUS CONTINUOUS
Status: DISCONTINUED | OUTPATIENT
Start: 2018-03-10 | End: 2018-03-15

## 2018-03-10 RX ORDER — HYDROCORTISONE ACETATE 25 MG/1
25 SUPPOSITORY RECTAL 2 TIMES DAILY
Status: DISCONTINUED | OUTPATIENT
Start: 2018-03-10 | End: 2018-03-15

## 2018-03-10 RX ORDER — RISPERIDONE 0.25 MG/1
0.5 TABLET, FILM COATED ORAL EVERY 8 HOURS PRN
Status: DISCONTINUED | OUTPATIENT
Start: 2018-03-10 | End: 2018-03-16 | Stop reason: HOSPADM

## 2018-03-10 RX ORDER — GEMFIBROZIL 600 MG/1
600 TABLET, FILM COATED ORAL
Status: DISCONTINUED | OUTPATIENT
Start: 2018-03-10 | End: 2018-03-16 | Stop reason: HOSPADM

## 2018-03-10 RX ORDER — LEVETIRACETAM 500 MG/1
500 TABLET ORAL 2 TIMES DAILY
Status: DISCONTINUED | OUTPATIENT
Start: 2018-03-10 | End: 2018-03-16 | Stop reason: HOSPADM

## 2018-03-10 RX ORDER — ATORVASTATIN CALCIUM 20 MG/1
20 TABLET, FILM COATED ORAL DAILY
Status: DISCONTINUED | OUTPATIENT
Start: 2018-03-11 | End: 2018-03-16 | Stop reason: HOSPADM

## 2018-03-10 RX ORDER — LORAZEPAM 0.5 MG/1
0.5 TABLET ORAL EVERY 8 HOURS PRN
Status: DISCONTINUED | OUTPATIENT
Start: 2018-03-10 | End: 2018-03-16 | Stop reason: HOSPADM

## 2018-03-10 RX ORDER — RISPERIDONE 0.25 MG/1
0.5 TABLET, FILM COATED ORAL
Status: DISCONTINUED | OUTPATIENT
Start: 2018-03-10 | End: 2018-03-10

## 2018-03-10 RX ORDER — LISINOPRIL 10 MG/1
10 TABLET ORAL DAILY
Status: DISCONTINUED | OUTPATIENT
Start: 2018-03-11 | End: 2018-03-16 | Stop reason: HOSPADM

## 2018-03-10 RX ORDER — DONEPEZIL HYDROCHLORIDE 5 MG/1
10 TABLET, FILM COATED ORAL
Status: DISCONTINUED | OUTPATIENT
Start: 2018-03-10 | End: 2018-03-16 | Stop reason: HOSPADM

## 2018-03-10 RX ORDER — ACETAMINOPHEN 325 MG/1
650 TABLET ORAL EVERY 6 HOURS PRN
Status: DISCONTINUED | OUTPATIENT
Start: 2018-03-10 | End: 2018-03-16 | Stop reason: HOSPADM

## 2018-03-10 RX ORDER — AZATHIOPRINE 50 MG/1
50 TABLET ORAL DAILY
Status: DISCONTINUED | OUTPATIENT
Start: 2018-03-11 | End: 2018-03-16 | Stop reason: HOSPADM

## 2018-03-10 RX ADMIN — HYDROCORTISONE ACETATE 25 MG: 25 SUPPOSITORY RECTAL at 18:38

## 2018-03-10 RX ADMIN — GEMFIBROZIL 600 MG: 600 TABLET, FILM COATED ORAL at 18:37

## 2018-03-10 RX ADMIN — LEVETIRACETAM 500 MG: 500 TABLET ORAL at 18:40

## 2018-03-10 RX ADMIN — APIXABAN 2.5 MG: 2.5 TABLET, FILM COATED ORAL at 18:35

## 2018-03-10 RX ADMIN — SODIUM CHLORIDE 1000 ML: 0.9 INJECTION, SOLUTION INTRAVENOUS at 11:00

## 2018-03-10 RX ADMIN — CEFTRIAXONE 2000 MG: 2 INJECTION, SOLUTION INTRAVENOUS at 15:00

## 2018-03-10 NOTE — H&P
History and Physical - [facility] Internal Medicine    Patient Information: Eddie Saleh 65 y o  male MRN: [id number]  Unit/Bed#: ED 17 Encounter: [id number]  Admitting Physician: Joseph Marsh MD  PCP: Belem Lovett DO  Date of Admission:  03/10/18    Assessment/Plan:    Hospital Problem List:     Active problems:     Plan for the Primary Problem(s):  · AMS: possibly due to toxic metabolic encephalopathy in the setting of UTI   · Questionable syncope: patient was found down on the floor   · Possibly 2/2 underlying Afib vs  CAD vs  CVA vs  UTI vs  Seizure disorder   · Telemetry   · Check TNI x 3  · Check 2D ECHO  · Abx as below  · May need stress vs and/or EEG   · Neurology and cardiology consults   · Pyuria: likely 2/2 UTI - acute cystitis w/hematuria   · F/u UCx   · F/u BCUL x 2   · Afebrile, no leukocytosis   · C/w Ceftriaxone    · IVF NS at 75 mL/hr   · Weakness: likely 2/2 above    Plan for Additional Problems:   · HTN: resume Lisinopril   · HLP: resume Lipitor and Gemfibrozil   · CAD s/p CABG: resume Lisinopril and Gemfibrozil   · Likely not on ASA due to the fact patient is on Eliquis  · Afib: Currently, NSR   Not on a rate controlling agent  · Resume Eliquis   · T2DM w/hyperglycemia: on Sitagliptin, Metformin and Pioglitazone outpatient - will hold  · Start FAITH AC/HS while inpatient   · Seizure disorder: check Keppra level   ·   resume Keppra  · Hx of CVA: resume Eliquis and Lipitor  · UC: Resume Imuran and Apriso   ·      VTE Prophylaxis: Eliquis/ sequential compression device   Code Status: Full code  POLST: There is no POLST form on file for this patient (pre-hospital)    Anticipated Length of Stay:  Patient will be admitted on an Inpatient basis with an anticipated length of stay of  > 2 midnights  Justification for Hospital Stay: IVF NS and IV antibiotics     Total Time for Visit, including Counseling / Coordination of Care: 45 minutes    Greater than 50% of this total time spent on direct patient counseling and coordination of care  Chief Complaint:   AMS    History of Present Illness:    Junior Fregoso is a 65 y o  male w/PMH of HTN, HLP, T2DM, CAD s/p CABG, seizure disorder, CVA w/residual aphasia, UC and dementia who presents with AMS  HE wound found on the floor by his wife  She states that this has happened in the past because he likes to play with the dogs  She does not believe that he fell out of bed  He wasrecently treated for an E coli UTI with Keflex  Patient is unable to provide any history; therefore, all of the information was obtained by the patient's wife and ED records  His wife states that he has progressively become become weaker the past couple of days  He is able to communicate with her but has residual aphasia from the CVA  Along with his dementia, she states that he is unable to answer questions appropriately  As per the wife, the patient states that he keeps on stating that he needs to urinate  She did not notice a foul smell; however, EMS and the ED RNs reported that his urine was foul smelling he thinks that he is dehydrated  He is able to tolerate regular consistency food without difficulty  He ambulates with assistance  His wife reports that she only gets about 1 5 hrs of sleep a night due to all of his needs  She states that he is unable to express when something is wrong  She believes that he does not feel any pain  He does not have a living will or POA  His wife states that she wants his code status to be a DNR  She understands that neither CPR nor intubation will be performed in the event his heart stops beating or he stops breathing  In the ED, labs were drawn  CT head w/o contrast was obtained  Patient received IVF NS 1 L x 1 and Ceftriaxone 1 gm IVPB x 1       Review of Systems:    Review of Systems   Unable to perform ROS: Dementia       Past Medical and Surgical History:     Past Medical History:   Diagnosis Date    Anxiety     Cardiac disease     Dementia  Diabetes mellitus ([facility] [address] )     Hyperlipidemia     Hypertension     Polio     Psychiatric disorder     Seizures ([facility] [address] )     1 seizure     Stroke [facility])        Past Surgical History:   Procedure Laterality Date    APPENDECTOMY      CARDIAC SURGERY      PA ESOPHAGOGASTRODUODENOSCOPY TRANSORAL DIAGNOSTIC N/A 3/21/2017    Procedure: ESOPHAGOGASTRODUODENOSCOPY (EGD); Surgeon: Carly Nava MD;  Location: [facility]; Service: Gastroenterology    ROTATOR CUFF REPAIR Left        Meds/Allergies:    Prior to Admission medications    Medication Sig Start Date End Date Taking?  Authorizing Provider   apixaban (ELIQUIS) 2 5 mg Take 2 5 mg by mouth 2 (two) times a day   Yes Historical Provider, MD   atorvastatin (LIPITOR) 20 mg tablet Take 1 tablet by mouth daily 1/26/18  Yes Rut Muro DO   azaTHIOprine (IMURAN) 50 mg tablet Take 50 mg by mouth daily   Yes Historical Provider, MD   donepezil (ARICEPT) 10 mg tablet Take 10 mg by mouth daily at bedtime   Yes Historical Provider, MD   gemfibrozil (LOPID) 600 mg tablet Take 600 mg by mouth 2 (two) times a day before meals   Yes Historical Provider, MD   levETIRAcetam (KEPPRA) 500 mg tablet take 1 tablet by mouth twice a day 1/29/18  Yes Rut Muro DO   lisinopril (ZESTRIL) 10 mg tablet Take 1 tablet by mouth daily for 30 days 2/18/17 3/10/18 Yes Romana Ram MD   LORazepam (ATIVAN) 0 5 mg tablet Take 1 tablet (0 5 mg total) by mouth every 8 (eight) hours as needed for anxiety for up to 4 days 3/5/18 3/10/18 Yes Lissy Parsons MD   mesalamine (APRISO) 0 375 G 24 hr capsule Take 375 mg by mouth daily     Yes Historical Provider, MD   mesalamine (APRISO) 0 375 g 24 hr capsule Take by mouth 1/29/15  Yes Historical Provider, MD   pioglitazone-metFORMIN (ACTOPLUS MET)  MG per tablet Take 1 tablet by mouth 2 (two) times a day with meals   Yes Historical Provider, MD   risperiDONE (RisperDAL) 2 mg tablet  2/3/18  Yes Historical Provider, MD   sertraline (ZOLOFT) 50 mg tablet Take 50 mg by mouth daily at bedtime   Yes Historical Provider, MD   sitaGLIPtin (JANUVIA) 100 mg tablet Take 1 tablet by mouth daily 12/1/17  Yes Historical Provider, MD   risperiDONE (RisperDAL) 0 5 mg tablet Take 0 5 mg by mouth daily at bedtime  3/10/18 Yes Historical Provider, MD   hydrocortisone (ANUSOL-HC) 25 mg suppository Insert 1 suppository into the rectum 2 (two) times a day for 30 days 2/18/17 3/20/17  Romana Sen MD     I have reviewed home medications with patient family member  Allergies: No Known Allergies    Social History:     Marital Status: /Civil Union   Occupation: Was a   Patient Pre-hospital Living Situation: Lives w/his wife   Patient Pre-hospital Level of Mobility: Ambulates with assistance, able to feed himself, cannot bathe himself   Patient Pre-hospital Diet Restrictions: None   Substance Use History:   History   Alcohol Use No     History   Smoking Status    Former Smoker   Smokeless Tobacco    Never Used     History   Drug Use No       Family History:    History reviewed  No pertinent family history  Physical Exam:     Vitals:   Blood Pressure: 170/75 (03/10/18 1230)  Pulse: 78 (03/10/18 1230)  Temperature: 97 8 °F (36 6 °C) (03/10/18 1007)  Temp Source: Rectal (03/10/18 1007)  Respirations: (!) 23 (03/10/18 1230)  Height: 5' 7" (170 2 cm) (03/10/18 1007)  Weight - Scale: 95 kg (209 lb 7 oz) (03/10/18 1007)  SpO2: 98 % (03/10/18 1230)    Physical Exam   Constitutional: He appears well-developed and well-nourished  No distress  HENT:   Head: Normocephalic and atraumatic  Nose: Nose normal    Mouth/Throat: Oropharynx is clear and moist    Eyes: Conjunctivae are normal  Pupils are equal, round, and reactive to light  Neck: Neck supple  No JVD present  Cardiovascular: Normal rate, regular rhythm and normal heart sounds  Exam reveals no gallop and no friction rub  No murmur heard    Prior sternotomy incision scar well healed Pulmonary/Chest: Effort normal and breath sounds normal  No respiratory distress  He has no wheezes  He has no rales  He exhibits no tenderness  Abdominal: Soft  Bowel sounds are normal  He exhibits no distension  There is no tenderness  There is no rebound and no guarding  Genitourinary:   Genitourinary Comments: Swanson catheter draining clear, yellow colored urine    Musculoskeletal: He exhibits no edema  Neurological:   Awake    Skin: Skin is warm and dry  No rash noted  Psychiatric: He has a normal mood and affect  Vitals reviewed  Additional Data:     Lab Results: I have personally reviewed pertinent reports  Results from last 7 days  Lab Units 03/10/18  1209   WBC Thousand/uL 6 21   HEMOGLOBIN g/dL 14 4   HEMATOCRIT % 43 9   PLATELETS Thousands/uL 153   NEUTROS PCT % 84*   LYMPHS PCT % 8*   MONOS PCT % 7   EOS PCT % 0           Invalid input(s): LABALBU       Results Reviewed     Procedure Component Value Units Date/Time    Troponin I [[id number]]  (Normal) Collected:  03/10/18 1508    Lab Status:  Final result Specimen:  Blood from Arm, Left Updated:  03/10/18 1546     Troponin I <0 02 ng/mL     Narrative:         [employer] Chemistry analyzer 99% cutoff is > 0 04 ng/mL in network labs    o cTnI 99% cutoff is useful only when applied to patients in the clinical setting of myocardial ischemia  o cTnI 99% cutoff should be interpreted in the context of clinical history, ECG findings and possibly cardiac imaging to establish correct diagnosis  o cTnI 99% cutoff may be suggestive but clearly not indicative of a coronary event without the clinical setting of myocardial ischemia      Hepatic function panel [[id number]]  (Normal) Collected:  03/10/18 1506    Lab Status:  Final result Specimen:  Blood from Arm, Left Updated:  03/10/18 1545     Total Bilirubin 0 70 mg/dL      Bilirubin, Direct 0 18 mg/dL      Alkaline Phosphatase 72 U/L      AST 13 U/L      ALT 17 U/L      Total Protein 7 6 g/dL Albumin 3 7 g/dL     Lactic acid, plasma [[id number]]  (Normal) Collected:  03/10/18 1508    Lab Status:  Final result Specimen:  Blood from Arm, Left Updated:  03/10/18 1538     LACTIC ACID 1 1 mmol/L     Narrative:         Result may be elevated if tourniquet was used during collection  Basic metabolic panel [[id number]]  (Abnormal) Collected:  03/10/18 1506    Lab Status:  Final result Specimen:  Blood from Arm, Left Updated:  03/10/18 1529     Sodium 136 mmol/L      Potassium 3 8 mmol/L      Chloride 99 (L) mmol/L      CO2 27 mmol/L      Anion Gap 10 mmol/L      BUN 12 mg/dL      Creatinine 0 87 mg/dL      Glucose 291 (H) mg/dL      Calcium 9 2 mg/dL      eGFR 83 ml/min/1 73sq m     Narrative:         National Kidney Disease Education Program recommendations are as follows:  GFR calculation is accurate only with a steady state creatinine  Chronic Kidney disease less than 60 ml/min/1 73 sq  meters  Kidney failure less than 15 ml/min/1 73 sq  meters      Magnesium [[id number]]  (Normal) Collected:  03/10/18 1506    Lab Status:  Final result Specimen:  Blood from Arm, Left Updated:  03/10/18 1529     Magnesium 1 8 mg/dL     Urine Microscopic [[id number]]  (Abnormal) Collected:  03/10/18 1213    Lab Status:  Final result Specimen:  Urine from Urine, Indwelling Swanson Catheter Updated:  03/10/18 1243     RBC, UA Innumerable (A) /hpf      WBC, UA 2-4 (A) /hpf      Epithelial Cells None Seen /hpf      Bacteria, UA Moderate (A) /hpf     UA w Reflex to Microscopic w Reflex to Culture [[id number]]  (Abnormal) Collected:  03/10/18 1213    Lab Status:  Final result Specimen:  Urine from Urine, Indwelling Swanson Catheter Updated:  03/10/18 1227     Color, UA Yellow     Clarity, UA Cloudy     Specific Gravity, UA 1 025     pH, UA 5 5     Leukocytes, UA Small (A)     Nitrite, UA Negative     Protein, UA 30 (1+) (A) mg/dl      Glucose, UA >=1000 (1%) (A) mg/dl      Ketones, UA 15 (1+) (A) mg/dl      Urobilinogen, UA 0 2 E U /dl Bilirubin, UA Negative     Blood, UA Large (A)    Blood culture #1 [[id number]] Collected:  03/10/18 1216    Lab Status: In process Specimen:  Blood from Hand, Left Updated:  03/10/18 1220    CBC and differential [[id number]]  (Abnormal) Collected:  03/10/18 1209    Lab Status:  Final result Specimen:  Blood from Arm, Right Updated:  03/10/18 1216     WBC 6 21 Thousand/uL      RBC 4 94 Million/uL      Hemoglobin 14 4 g/dL      Hematocrit 43 9 %      MCV 89 fL      MCH 29 1 pg      MCHC 32 8 g/dL      RDW 14 6 %      MPV 10 8 fL      Platelets 574 Thousands/uL      nRBC 0 /100 WBCs      Neutrophils Relative 84 (H) %      Lymphocytes Relative 8 (L) %      Monocytes Relative 7 %      Eosinophils Relative 0 %      Basophils Relative 0 %      Neutrophils Absolute 5 22 Thousands/µL      Lymphocytes Absolute 0 52 (L) Thousands/µL      Monocytes Absolute 0 44 Thousand/µL      Eosinophils Absolute 0 00 Thousand/µL      Basophils Absolute 0 01 Thousands/µL     Blood culture #2 [[id number]] Collected:  03/10/18 1209    Lab Status: In process Specimen:  Blood from Arm, Right Updated:  03/10/18 1213            Imaging: I have personally reviewed pertinent films in PACS    Ct Head Without Contrast    Result Date: 3/10/2018  Narrative: CT BRAIN - WITHOUT CONTRAST INDICATION:   Fall while on blood thinners  COMPARISON:  2/15/2017  TECHNIQUE:  CT examination of the brain was performed  In addition to axial images, coronal 2D reformatted images were created and submitted for interpretation  Radiation dose length product (DLP) for this visit:  1030 mGy-cm   This examination, like all CT scans performed in the [facility], was performed utilizing techniques to minimize radiation dose exposure, including the use of iterative reconstruction and automated exposure control  IMAGE QUALITY:  Diagnostic   FINDINGS: PARENCHYMA:  Chronic infarcts with encephalomalacia involving the right frontal lobe, right occipital lobe, left parietotemporal region, and left cerebellar hemisphere--unchanged  Background of moderate intrahepatic white matter changes  No acute infarcts  No parenchymal hemorrhage  No mass  VENTRICLES AND EXTRA-AXIAL SPACES:  Ex vacuo dilatation of the atrium and temporal horn of the left lateral ventricle due to encephalomalacia  No hydrocephalus, herniation, or mass effect  No extra-axial or intraventricular hemorrhage  VISUALIZED ORBITS AND PARANASAL SINUSES:  Unremarkable  CALVARIUM AND EXTRACRANIAL SOFT TISSUES:  No displaced or depressed skull fractures  No acute soft tissue swelling  Incidental note of left frontal scalp lipoma measuring 22 mm  Impression: No acute intracranial abnormality or significant change from prior  Multiple old cerebral and cerebellar infarcts  Workstation performed: [id number]       EKG, Pathology, and Other Studies Reviewed on Admission:   · EKG: NSR    Allscripts / Epic Records Reviewed: Yes     ** Please Note: This note has been constructed using a voice recognition system   **

## 2018-03-10 NOTE — ED NOTES
Call placed to [facility] on [address] to verify medications       Siobhan Llanes RN  03/10/18 9688

## 2018-03-10 NOTE — ED PROVIDER NOTES
History  Chief Complaint   Patient presents with    Altered Mental Status     patient with history of dementia, was found on floor with blanket and pillow, foul smelling urine  HPI  69-year-old white male with a chief complaint of being found on the floor by his wife  Wife states she does not think that he fell out of bed was increasingly weak over the last several days  Wife states he has been been treated for a UTI x2 over the past 3 weeks but foul-smelling urine is reported  Wife states patient had a stroke in 2008 and ambulates with a walker however for the past couple days patient has been increasingly weaker  Patient has a history of dementia  Patient lives at home with his wife  Patient is not alert and oriented to place or time  Patient keeps repeating and he has to go to the bathroom  Wife states that she found patient face down  Patient is on blood thinners  Prior to Admission Medications   Prescriptions Last Dose Informant Patient Reported? Taking?    LORazepam (ATIVAN) 0 5 mg tablet 3/9/2018 at Unknown time  No Yes   Sig: Take 1 tablet (0 5 mg total) by mouth every 8 (eight) hours as needed for anxiety for up to 4 days   apixaban (ELIQUIS) 2 5 mg 3/9/2018 at Unknown time  Yes Yes   Sig: Take 2 5 mg by mouth 2 (two) times a day   atorvastatin (LIPITOR) 20 mg tablet 3/9/2018 at Unknown time  No Yes   Sig: Take 1 tablet by mouth daily   azaTHIOprine (IMURAN) 50 mg tablet 3/9/2018 at Unknown time  Yes Yes   Sig: Take 50 mg by mouth daily   donepezil (ARICEPT) 10 mg tablet 3/9/2018 at Unknown time  Yes Yes   Sig: Take 10 mg by mouth daily at bedtime   gemfibrozil (LOPID) 600 mg tablet 3/9/2018 at Unknown time  Yes Yes   Sig: Take 600 mg by mouth 2 (two) times a day before meals   hydrocortisone (ANUSOL-HC) 25 mg suppository   No No   Sig: Insert 1 suppository into the rectum 2 (two) times a day for 30 days   levETIRAcetam (KEPPRA) 500 mg tablet 3/9/2018 at Unknown time  No Yes   Sig: take 1 tablet by mouth twice a day   lisinopril (ZESTRIL) 10 mg tablet 3/9/2018 at Unknown time Spouse/Significant Other No Yes   Sig: Take 1 tablet by mouth daily for 30 days   mesalamine (APRISO) 0 375 G 24 hr capsule 3/9/2018 at Unknown time  Yes Yes   Sig: Take 750 mg by mouth daily     pioglitazone-metFORMIN (ACTOPLUS MET)  MG per tablet 3/9/2018 at Unknown time  Yes Yes   Sig: Take 1 tablet by mouth 2 (two) times a day with meals   risperiDONE (RisperDAL) 2 mg tablet 3/9/2018 at Unknown time  Yes Yes   sertraline (ZOLOFT) 50 mg tablet 3/9/2018 at Unknown time  Yes Yes   Sig: Take 50 mg by mouth daily at bedtime   sitaGLIPtin (JANUVIA) 100 mg tablet 3/9/2018 at Unknown time  Yes Yes   Sig: Take 1 tablet by mouth daily      Facility-Administered Medications: None       Past Medical History:   Diagnosis Date    Anxiety     Cardiac disease     Dementia     Diabetes mellitus ([facility] [address] [facility]     Hyperlipidemia     Hypertension     Polio     Psychiatric disorder     Seizures ([facility] [address] )     1 seizure     Stroke [facility])        Past Surgical History:   Procedure Laterality Date    APPENDECTOMY      CARDIAC SURGERY      ME ESOPHAGOGASTRODUODENOSCOPY TRANSORAL DIAGNOSTIC N/A 3/21/2017    Procedure: ESOPHAGOGASTRODUODENOSCOPY (EGD); Surgeon: Raoul Sahu MD;  Location: [facility]; Service: Gastroenterology    ROTATOR CUFF REPAIR Left        History reviewed  No pertinent family history  I have reviewed and agree with the history as documented  Social History   Substance Use Topics    Smoking status: Former Smoker    Smokeless tobacco: Never Used    Alcohol use No        Review of Systems   Constitutional: Positive for activity change and fatigue  Negative for diaphoresis and fever  HENT: Negative for congestion, ear pain, nosebleeds and sore throat  Eyes: Negative for photophobia, pain, discharge and visual disturbance     Respiratory: Negative for cough, choking, chest tightness, shortness of breath and wheezing  Cardiovascular: Negative for chest pain and palpitations  Gastrointestinal: Negative for abdominal distention, abdominal pain, diarrhea and vomiting  Genitourinary: Positive for difficulty urinating, dysuria and urgency  Negative for flank pain and frequency  Musculoskeletal: Negative for back pain, gait problem and joint swelling  Skin: Negative for color change and rash  Neurological: Positive for weakness and light-headedness  Negative for dizziness, syncope and headaches  History of dementia   Psychiatric/Behavioral: Positive for confusion  Negative for behavioral problems  The patient is not nervous/anxious  All other systems reviewed and are negative  Physical Exam  ED Triage Vitals [03/10/18 1007]   Temperature Pulse Respirations Blood Pressure SpO2   97 8 °F (36 6 °C) 71 16 159/72 96 %      Temp Source Heart Rate Source Patient Position - Orthostatic VS BP Location FiO2 (%)   Rectal Monitor Lying Right arm --      Pain Score       No Pain           Orthostatic Vital Signs  Vitals:    03/10/18 1015 03/10/18 1230 03/10/18 1400 03/10/18 1415   BP: 159/72 170/75 157/72    Pulse: 70 78 71 79   Patient Position - Orthostatic VS:           Physical Exam   Constitutional: He appears well-developed and well-nourished  53-year-old white male lying stretcher in no acute distress  Patient keeps verbalizing that he has to go to the bathroom  Patient is not alert and oriented to place or time  Patient has a history of dementia  HENT:   Head: Normocephalic and atraumatic  Mouth/Throat: Oropharynx is clear and moist    There is some soft tissue swelling of the mid forehead on exam   However when questioned the wife states that that has always been present  Eyes: EOM are normal  Pupils are equal, round, and reactive to light  Neck: Normal range of motion  Neck supple  Cardiovascular: Normal rate, regular rhythm and normal heart sounds      Pulmonary/Chest: Effort normal and breath sounds normal  No respiratory distress  He has no wheezes  He exhibits no tenderness  Abdominal: Soft  Bowel sounds are normal  There is no tenderness  There is no rebound and no guarding  Genitourinary: Penis normal    Musculoskeletal: Normal range of motion  Patient has generalized weakness but is able to move all extremities in the upper extremities and lower extremities bilaterally   Neurological: He is alert  Coordination abnormal    Patient has dementia and is disoriented to place and time   Skin: Skin is warm and dry  Psychiatric:   Patient has a very flat affect   Nursing note and vitals reviewed        ED Medications  Medications   cefTRIAXone (ROCEPHIN) IVPB (premix) 2,000 mg (not administered)   sodium chloride 0 9 % bolus 1,000 mL (0 mL Intravenous Stopped 3/10/18 1248)       Diagnostic Studies  Results Reviewed     Procedure Component Value Units Date/Time    Basic metabolic panel [[id number]]     Lab Status:  No result Specimen:  Blood     Hepatic function panel [[id number]] Updated:  03/10/18 1251    Lab Status:  No result Specimen:  Blood from Arm, Right     Urine Microscopic [[id number]]  (Abnormal) Collected:  03/10/18 1213    Lab Status:  Final result Specimen:  Urine from Urine, Indwelling Swanson Catheter Updated:  03/10/18 1243     RBC, UA Innumerable (A) /hpf      WBC, UA 2-4 (A) /hpf      Epithelial Cells None Seen /hpf      Bacteria, UA Moderate (A) /hpf     UA w Reflex to Microscopic w Reflex to Culture [[id number]]  (Abnormal) Collected:  03/10/18 1213    Lab Status:  Final result Specimen:  Urine from Urine, Indwelling Swanson Catheter Updated:  03/10/18 1227     Color, UA Yellow     Clarity, UA Cloudy     Specific Gravity, UA 1 025     pH, UA 5 5     Leukocytes, UA Small (A)     Nitrite, UA Negative     Protein, UA 30 (1+) (A) mg/dl      Glucose, UA >=1000 (1%) (A) mg/dl      Ketones, UA 15 (1+) (A) mg/dl      Urobilinogen, UA 0 2 E U /dl      Bilirubin, UA Negative     Blood, UA Large (A)    Blood culture #1 [[id number]] Collected:  03/10/18 1216    Lab Status: In process Specimen:  Blood from Hand, Left Updated:  03/10/18 1220    CBC and differential [[id number]]  (Abnormal) Collected:  03/10/18 1209    Lab Status:  Final result Specimen:  Blood from Arm, Right Updated:  03/10/18 1216     WBC 6 21 Thousand/uL      RBC 4 94 Million/uL      Hemoglobin 14 4 g/dL      Hematocrit 43 9 %      MCV 89 fL      MCH 29 1 pg      MCHC 32 8 g/dL      RDW 14 6 %      MPV 10 8 fL      Platelets 862 Thousands/uL      nRBC 0 /100 WBCs      Neutrophils Relative 84 (H) %      Lymphocytes Relative 8 (L) %      Monocytes Relative 7 %      Eosinophils Relative 0 %      Basophils Relative 0 %      Neutrophils Absolute 5 22 Thousands/µL      Lymphocytes Absolute 0 52 (L) Thousands/µL      Monocytes Absolute 0 44 Thousand/µL      Eosinophils Absolute 0 00 Thousand/µL      Basophils Absolute 0 01 Thousands/µL     Blood culture #2 [[id number]] Collected:  03/10/18 1209    Lab Status: In process Specimen:  Blood from Arm, Right Updated:  03/10/18 1213                 CT head without contrast   Final Result by Neo Jeffersno MD (03/10 1133)      No acute intracranial abnormality or significant change from prior  Multiple old cerebral and cerebellar infarcts  Workstation performed: [id number]                    Procedures  Procedures       Phone Contacts  ED Phone Contact    ED Course  ED Course      12:50 PM:  Spoke with Dr Cipriano López - will admit for UTI/weakness    Reviewed CT results in lab work with patient's wife  Patient's wife states that the patient has a flight of steps to go up when they go home and he is unable to do that  Patient's wife is requesting some physical therapy for patient while in the hospital   She would like him to go to rehab after his admission if possible  MDM  CritCare Time     Differential diagnosis includes:  1  Change in mental status  2    Dementia 3  UTI  4  Urosepsis  5  Electrolyte imbalance  6  Fall  7  Weakness  8  Dehydration    Disposition  Final diagnoses:   UTI (urinary tract infection)   Weakness   Dementia     Time reflects when diagnosis was documented in both MDM as applicable and the Disposition within this note     Time User Action Codes Description Comment    3/10/2018 12:53 PM Gabriela Weinstein [N39 0] UTI (urinary tract infection)     3/10/2018 12:54 PM Gabriela León Add [R53 1] Weakness     3/10/2018 12:54 PM Gabriela León Add [F03 90] Dementia       ED Disposition     ED Disposition Condition Comment    Admit  Case was discussed with Dr Katarzyna Maradiaga and the patient's admission status was agreed to be Admission Status: inpatient status to the service of Dr Katarzyna Maradiaga   Follow-up Information    None       Patient's Medications   Discharge Prescriptions    No medications on file     No discharge procedures on file      ED Provider  Electronically Signed by           Myriam Jackson DO  03/10/18 [address],   03/10/18 1440

## 2018-03-11 LAB
ALBUMIN SERPL BCP-MCNC: 3.4 G/DL (ref 3.5–5)
ALP SERPL-CCNC: 68 U/L (ref 46–116)
ALT SERPL W P-5'-P-CCNC: 16 U/L (ref 12–78)
ANION GAP SERPL CALCULATED.3IONS-SCNC: 16 MMOL/L (ref 4–13)
AST SERPL W P-5'-P-CCNC: 20 U/L (ref 5–45)
BACTERIA UR QL AUTO: ABNORMAL /HPF
BILIRUB SERPL-MCNC: 0.8 MG/DL (ref 0.2–1)
BILIRUB UR QL STRIP: ABNORMAL
BUN SERPL-MCNC: 13 MG/DL (ref 5–25)
CALCIUM SERPL-MCNC: 9 MG/DL (ref 8.3–10.1)
CHLORIDE SERPL-SCNC: 99 MMOL/L (ref 100–108)
CLARITY UR: ABNORMAL
CO2 SERPL-SCNC: 22 MMOL/L (ref 21–32)
COLOR UR: YELLOW
CREAT SERPL-MCNC: 0.71 MG/DL (ref 0.6–1.3)
ERYTHROCYTE [DISTWIDTH] IN BLOOD BY AUTOMATED COUNT: 14.6 % (ref 11.6–15.1)
GFR SERPL CREATININE-BSD FRML MDRD: 90 ML/MIN/1.73SQ M
GLUCOSE SERPL-MCNC: 188 MG/DL (ref 65–140)
GLUCOSE SERPL-MCNC: 199 MG/DL (ref 65–140)
GLUCOSE SERPL-MCNC: 248 MG/DL (ref 65–140)
GLUCOSE SERPL-MCNC: 271 MG/DL (ref 65–140)
GLUCOSE SERPL-MCNC: 305 MG/DL (ref 65–140)
GLUCOSE UR STRIP-MCNC: ABNORMAL MG/DL
HCT VFR BLD AUTO: 42.1 % (ref 36.5–49.3)
HGB BLD-MCNC: 13.9 G/DL (ref 12–17)
HGB UR QL STRIP.AUTO: ABNORMAL
KETONES UR STRIP-MCNC: ABNORMAL MG/DL
LEUKOCYTE ESTERASE UR QL STRIP: ABNORMAL
MCH RBC QN AUTO: 29.3 PG (ref 26.8–34.3)
MCHC RBC AUTO-ENTMCNC: 33 G/DL (ref 31.4–37.4)
MCV RBC AUTO: 89 FL (ref 82–98)
NITRITE UR QL STRIP: NEGATIVE
NON-SQ EPI CELLS URNS QL MICRO: ABNORMAL /HPF
PH UR STRIP.AUTO: 6.5 [PH] (ref 4.5–8)
PLATELET # BLD AUTO: 164 THOUSANDS/UL (ref 149–390)
PMV BLD AUTO: 11.6 FL (ref 8.9–12.7)
POTASSIUM SERPL-SCNC: 3.9 MMOL/L (ref 3.5–5.3)
PROT SERPL-MCNC: 7.1 G/DL (ref 6.4–8.2)
PROT UR STRIP-MCNC: ABNORMAL MG/DL
RBC # BLD AUTO: 4.74 MILLION/UL (ref 3.88–5.62)
RBC #/AREA URNS AUTO: ABNORMAL /HPF
SODIUM SERPL-SCNC: 137 MMOL/L (ref 136–145)
SP GR UR STRIP.AUTO: 1.02 (ref 1–1.03)
TROPONIN I SERPL-MCNC: <0.02 NG/ML
UROBILINOGEN UR QL STRIP.AUTO: 0.2 E.U./DL
WBC # BLD AUTO: 6.9 THOUSAND/UL (ref 4.31–10.16)
WBC #/AREA URNS AUTO: ABNORMAL /HPF

## 2018-03-11 PROCEDURE — 82948 REAGENT STRIP/BLOOD GLUCOSE: CPT

## 2018-03-11 PROCEDURE — 99222 1ST HOSP IP/OBS MODERATE 55: CPT | Performed by: PSYCHIATRY & NEUROLOGY

## 2018-03-11 PROCEDURE — 84484 ASSAY OF TROPONIN QUANT: CPT | Performed by: INTERNAL MEDICINE

## 2018-03-11 PROCEDURE — 81001 URINALYSIS AUTO W/SCOPE: CPT | Performed by: INTERNAL MEDICINE

## 2018-03-11 PROCEDURE — 80053 COMPREHEN METABOLIC PANEL: CPT | Performed by: INTERNAL MEDICINE

## 2018-03-11 PROCEDURE — 87086 URINE CULTURE/COLONY COUNT: CPT | Performed by: INTERNAL MEDICINE

## 2018-03-11 PROCEDURE — 99232 SBSQ HOSP IP/OBS MODERATE 35: CPT | Performed by: INTERNAL MEDICINE

## 2018-03-11 PROCEDURE — 85027 COMPLETE CBC AUTOMATED: CPT | Performed by: INTERNAL MEDICINE

## 2018-03-11 RX ORDER — AMLODIPINE BESYLATE 5 MG/1
5 TABLET ORAL DAILY
Status: DISCONTINUED | OUTPATIENT
Start: 2018-03-11 | End: 2018-03-16 | Stop reason: HOSPADM

## 2018-03-11 RX ADMIN — SODIUM CHLORIDE 75 ML/HR: 0.9 INJECTION, SOLUTION INTRAVENOUS at 17:29

## 2018-03-11 RX ADMIN — AMLODIPINE BESYLATE 5 MG: 5 TABLET ORAL at 13:16

## 2018-03-11 RX ADMIN — LEVETIRACETAM 500 MG: 500 TABLET ORAL at 10:56

## 2018-03-11 RX ADMIN — SERTRALINE HYDROCHLORIDE 50 MG: 50 TABLET ORAL at 04:14

## 2018-03-11 RX ADMIN — CEFTRIAXONE 2000 MG: 2 INJECTION, SOLUTION INTRAVENOUS at 13:17

## 2018-03-11 RX ADMIN — MESALAMINE 500 MG: 250 CAPSULE ORAL at 10:57

## 2018-03-11 RX ADMIN — APIXABAN 2.5 MG: 2.5 TABLET, FILM COATED ORAL at 17:13

## 2018-03-11 RX ADMIN — LORAZEPAM 0.5 MG: 0.5 TABLET ORAL at 03:00

## 2018-03-11 RX ADMIN — SODIUM CHLORIDE 75 ML/HR: 0.9 INJECTION, SOLUTION INTRAVENOUS at 01:00

## 2018-03-11 RX ADMIN — LISINOPRIL 10 MG: 10 TABLET ORAL at 10:56

## 2018-03-11 RX ADMIN — MESALAMINE 500 MG: 250 CAPSULE ORAL at 21:05

## 2018-03-11 RX ADMIN — DONEPEZIL HYDROCHLORIDE 10 MG: 5 TABLET ORAL at 21:05

## 2018-03-11 RX ADMIN — GEMFIBROZIL 600 MG: 600 TABLET, FILM COATED ORAL at 17:09

## 2018-03-11 RX ADMIN — SERTRALINE HYDROCHLORIDE 50 MG: 50 TABLET ORAL at 21:05

## 2018-03-11 RX ADMIN — LORAZEPAM 0.5 MG: 0.5 TABLET ORAL at 20:38

## 2018-03-11 RX ADMIN — LEVETIRACETAM 500 MG: 500 TABLET ORAL at 17:14

## 2018-03-11 RX ADMIN — INSULIN LISPRO 2 UNITS: 100 INJECTION, SOLUTION INTRAVENOUS; SUBCUTANEOUS at 00:00

## 2018-03-11 RX ADMIN — DONEPEZIL HYDROCHLORIDE 10 MG: 5 TABLET ORAL at 04:13

## 2018-03-11 RX ADMIN — INSULIN LISPRO 4 UNITS: 100 INJECTION, SOLUTION INTRAVENOUS; SUBCUTANEOUS at 21:47

## 2018-03-11 RX ADMIN — SERTRALINE HYDROCHLORIDE 50 MG: 50 TABLET ORAL at 20:44

## 2018-03-11 RX ADMIN — MESALAMINE 500 MG: 250 CAPSULE ORAL at 20:39

## 2018-03-11 RX ADMIN — ATORVASTATIN CALCIUM 20 MG: 20 TABLET, FILM COATED ORAL at 10:53

## 2018-03-11 RX ADMIN — APIXABAN 2.5 MG: 2.5 TABLET, FILM COATED ORAL at 10:52

## 2018-03-11 RX ADMIN — MESALAMINE 500 MG: 250 CAPSULE ORAL at 17:14

## 2018-03-11 RX ADMIN — AZATHIOPRINE 50 MG: 50 TABLET ORAL at 10:55

## 2018-03-11 RX ADMIN — INSULIN LISPRO 2 UNITS: 100 INJECTION, SOLUTION INTRAVENOUS; SUBCUTANEOUS at 15:21

## 2018-03-11 RX ADMIN — HYDROCORTISONE ACETATE 25 MG: 25 SUPPOSITORY RECTAL at 10:56

## 2018-03-11 RX ADMIN — HYDROCORTISONE ACETATE 25 MG: 25 SUPPOSITORY RECTAL at 17:09

## 2018-03-11 RX ADMIN — GEMFIBROZIL 600 MG: 600 TABLET, FILM COATED ORAL at 10:56

## 2018-03-11 NOTE — CONSULTS
Consultation - Cardiology   Manjeet Burns 65 y o  male MRN: [id number]  Unit/Bed#: -01 Encounter: [id number]    Assessment/Plan     Assessment:  1  UTI  2  Cystitis with hematuria  3  Hx CAD with CABG 08/2015 at [facility]  4  Paroxysmal Afib - NSR   5  Advanced Dementia  6  Fall vs Syncope  7  Hypertension     Plan:  1  Check echocardiogram   2  Add Amlodipine 5mg daily for elevated BP    History of Present Illness   Physician Requesting Consult: Xu Almonte MD  Reason for Consult / Principal Problem: Afib  HPI: Manjeet Burns is a 65y o  year old male who presents with UTI  Pt with advanced dementia unable to collect HPI  Pt wife reports she awoke to found her  on the floor  She is not sure if he fell or passed out  She states his dementia is advacned and he does not know who she is anymore  She reports he has not complained of anything cardiac in nature  No CP, SOB or Palpitations  Consults    Review of Systems   Unable to perform ROS: Dementia    -    Historical Information   Past Medical History:   Diagnosis Date    Anxiety     Cardiac disease     Dementia     [facility] mellitus ([facility] )     Hyperlipidemia     Hypertension     Polio     Psychiatric disorder     Seizures [facility] [address] [facility]     1 seizure     Stroke [facility])      Past Surgical History:   Procedure Laterality Date    APPENDECTOMY      CARDIAC SURGERY      [place] ESOPHAGOGASTRODUODENOSCOPY TRANSORAL DIAGNOSTIC N/A 3/21/2017    Procedure: ESOPHAGOGASTRODUODENOSCOPY (EGD); Surgeon: Jaimee Clinton MD;  Location: [facility];   Service: Gastroenterology    ROTATOR CUFF REPAIR Left      History   Alcohol Use No     History   Drug Use No     History   Smoking Status    Former Smoker   Smokeless Tobacco    Never Used     Family History: non-contributory    Meds/Allergies   all current active meds have been reviewed, current meds:   Current Facility-Administered Medications   Medication Dose Route Frequency    acetaminophen (TYLENOL) tablet 650 mg  650 mg Oral Q6H PRN    apixaban (ELIQUIS) tablet 2 5 mg  2 5 mg Oral BID    atorvastatin (LIPITOR) tablet 20 mg  20 mg Oral Daily    azaTHIOprine (IMURAN) tablet 50 mg  50 mg Oral Daily    cefTRIAXone (ROCEPHIN) IVPB (premix) 2,000 mg  2,000 mg Intravenous Q24H    donepezil (ARICEPT) tablet 10 mg  10 mg Oral HS    gemfibrozil (LOPID) tablet 600 mg  600 mg Oral BID AC    hydrocortisone (ANUSOL-HC) rectal suppository 25 mg  25 mg Rectal BID    insulin lispro (HumaLOG) 100 units/mL subcutaneous injection 1-6 Units  1-6 Units Subcutaneous HS    insulin lispro (HumaLOG) 100 units/mL subcutaneous injection 2-12 Units  2-12 Units Subcutaneous TID AC    levETIRAcetam (KEPPRA) tablet 500 mg  500 mg Oral BID    lisinopril (ZESTRIL) tablet 10 mg  10 mg Oral Daily    LORazepam (ATIVAN) tablet 0 5 mg  0 5 mg Oral Q8H PRN    mesalamine (PENTASA) ER capsule 500 mg  500 mg Oral Daily    risperiDONE (RisperDAL) tablet 0 5 mg  0 5 mg Oral Q8H PRN    sertraline (ZOLOFT) tablet 50 mg  50 mg Oral HS    sodium chloride 0 9 % infusion  75 mL/hr Intravenous Continuous    and PTA meds:   Prior to Admission Medications   Prescriptions Last Dose Informant Patient Reported? Taking?    LORazepam (ATIVAN) 0 5 mg tablet 3/9/2018 at Unknown time  No Yes   Sig: Take 1 tablet (0 5 mg total) by mouth every 8 (eight) hours as needed for anxiety for up to 4 days   apixaban (ELIQUIS) 2 5 mg 3/9/2018 at Unknown time  Yes Yes   Sig: Take 2 5 mg by mouth 2 (two) times a day   atorvastatin (LIPITOR) 20 mg tablet 3/9/2018 at Unknown time  No Yes   Sig: Take 1 tablet by mouth daily   azaTHIOprine (IMURAN) 50 mg tablet 3/9/2018 at Unknown time  Yes Yes   Sig: Take 50 mg by mouth daily   donepezil (ARICEPT) 10 mg tablet 3/9/2018 at Unknown time  Yes Yes   Sig: Take 10 mg by mouth daily at bedtime   gemfibrozil (LOPID) 600 mg tablet 3/9/2018 at Unknown time  Yes Yes   Sig: Take 600 mg by mouth 2 (two) times a day before meals   hydrocortisone (ANUSOL-HC) 25 mg suppository   No No   Sig: Insert 1 suppository into the rectum 2 (two) times a day for 30 days   levETIRAcetam (KEPPRA) 500 mg tablet 3/9/2018 at Unknown time  No Yes   Sig: take 1 tablet by mouth twice a day   lisinopril (ZESTRIL) 10 mg tablet 3/9/2018 at Unknown time Spouse/Significant Other No Yes   Sig: Take 1 tablet by mouth daily for 30 days   mesalamine (APRISO) 0 375 G 24 hr capsule 3/9/2018 at Unknown time  Yes Yes   Sig: Take 750 mg by mouth daily     pioglitazone-metFORMIN (ACTOPLUS MET)  MG per tablet 3/9/2018 at Unknown time  Yes Yes   Sig: Take 1 tablet by mouth 2 (two) times a day with meals   risperiDONE (RisperDAL) 2 mg tablet 3/9/2018 at Unknown time  Yes Yes   sertraline (ZOLOFT) 50 mg tablet 3/9/2018 at Unknown time  Yes Yes   Sig: Take 50 mg by mouth daily at bedtime   sitaGLIPtin (JANUVIA) 100 mg tablet 3/9/2018 at Unknown time  Yes Yes   Sig: Take 1 tablet by mouth daily      Facility-Administered Medications: None     No Known Allergies    Objective   Vitals: Blood pressure (!) 181/79, pulse 76, temperature 97 7 °F (36 5 °C), temperature source Oral, resp  rate 18, height 5' 7" (1 702 m), weight 98 2 kg (216 lb 7 9 oz), SpO2 96 %  Orthostatic Blood Pressures    Flowsheet Row Most Recent Value   Blood Pressure   181/79 filed at 03/11/2018 0801   Patient Position - Orthostatic VS  Lying filed at 03/11/2018 0801            Intake/Output Summary (Last 24 hours) at 03/11/18 1053  Last data filed at 03/11/18 0559   Gross per 24 hour   Intake             2020 ml   Output             1950 ml   Net               70 ml       Invasive Devices     Peripheral Intravenous Line            Peripheral IV 03/10/18 Left Forearm less than 1 day          Drain            Urethral Catheter Temperature probe less than 1 day                Physical Exam   Constitutional: He appears well-developed and well-nourished  Neck: Normal range of motion     Cardiovascular: Normal rate and normal heart sounds  Exam reveals no gallop and no friction rub  No murmur heard  Pulmonary/Chest: Effort normal and breath sounds normal    Abdominal: Soft  Bowel sounds are normal    Musculoskeletal: He exhibits no edema  Skin: Skin is warm and dry  Lab Results:   I have personally reviewed pertinent lab results  CBC with diff:   Results from last 7 days  Lab Units 03/11/18  0526   WBC Thousand/uL 6 90   RBC Million/uL 4 74   HEMOGLOBIN g/dL 13 9   HEMATOCRIT % 42 1   MCV fL 89   MCH pg 29 3   MCHC g/dL 33 0   RDW % 14 6   MPV fL 11 6   PLATELETS Thousands/uL 164     CMP:   Results from last 7 days  Lab Units 03/11/18  0526   SODIUM mmol/L 137   POTASSIUM mmol/L 3 9   CHLORIDE mmol/L 99*   CO2 mmol/L 22   ANION GAP mmol/L 16*   BUN mg/dL 13   CREATININE mg/dL 0 71   GLUCOSE RANDOM mg/dL 271*   CALCIUM mg/dL 9 0   AST U/L 20   ALT U/L 16   ALK PHOS U/L 68   TOTAL PROTEIN g/dL 7 1   BILIRUBIN TOTAL mg/dL 0 80   EGFR ml/min/1 73sq m 90     Troponin:   0  Lab Value Date/Time   TROPONINI <0 02 03/11/2018 0030   TROPONINI <0 02 03/10/2018 2142   TROPONINI <0 02 03/10/2018 1508   TROPONINI <0 02 02/15/2017 1402   TROPONINI <0 04 06/15/2014 1721   TROPONINI <0 04 06/15/2014 1157   TROPONINI <0 04 06/15/2014 0738   TROPONINI 0 05 (H) 06/15/2014 0029     BNP:   Results from last 7 days  Lab Units 03/11/18  0526   SODIUM mmol/L 137   POTASSIUM mmol/L 3 9   CHLORIDE mmol/L 99*   CO2 mmol/L 22   ANION GAP mmol/L 16*   BUN mg/dL 13   CREATININE mg/dL 0 71   GLUCOSE RANDOM mg/dL 271*   CALCIUM mg/dL 9 0   EGFR ml/min/1 73sq m 90     Coags:     TSH:     Magnesium:   Results from last 7 days  Lab Units 03/10/18  1506   MAGNESIUM mg/dL 1 8     Lipid Profile:     Imaging: I have personally reviewed pertinent reports      EKG: NSR  VTE Prophylaxis:     Code Status: Level 3 - DNAR and DNI  Advance Directive and Living Will:      Power of :    POLST:      Counseling / Coordination of Care  Total floor / unit time spent today 50 minutes  Greater than 50% of total time was spent with the patient and / or family counseling and / or coordination of care  A description of the counseling / coordination of care: 50

## 2018-03-11 NOTE — CONSULTS
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 currently on Aricept  His wife reports that it is typical of him to get out of bed and get down on his hands and knees and crawl around the house as he does not want to fall  She states that yesterday morning she woke up and left her room and went into his room and found him on the side of the bed  He did not appear to have injured himself  She did notice that he seemed to be having more difficulty getting up than usual   Usually she is able to help him over to a chair or some other sturdy piece of furniture and he is able to pull himself up  Yesterday he was not able to do this  He also seemed to have some difficulty answering questions posed by EMS  She reports that he had been a bit more irritable and not acting his normal self over the last couple of days  He had completed a course of antibiotics for a bladder infection however on presentation the emergency room it was noted that he did have persistent urinary tract infection  Currently good stuff denies any headache or other pain symptoms  He knows he is in a hospital in [facility]  Review of Systems:  See history of present illness for pertinent neurological symptoms  All other systems are negative      Historical Information   Past Medical History:   Diagnosis Date    Anxiety     Cardiac disease     Dementia     [facility] mellitus [facility] [address] [facility]     Hyperlipidemia     Hypertension     Polio     Psychiatric disorder     Seizures ([address] )     1 seizure     Stroke [facility])      Past Surgical History:   Procedure Laterality Date    APPENDECTOMY      CARDIAC SURGERY      [facility] ESOPHAGOGASTRODUODENOSCOPY TRANSORAL DIAGNOSTIC N/A 3/21/2017    Procedure: ESOPHAGOGASTRODUODENOSCOPY (EGD); Surgeon: Susan Norris MD;  Location: [facility]; Service: Gastroenterology    ROTATOR CUFF REPAIR Left      Social History   History   Smoking Status    Former Smoker   Smokeless Tobacco    Never Used     History   Alcohol Use No     History   Drug Use No       Family History:   History reviewed  No pertinent family history      No Known Allergies    Meds:  All current active meds have been reviewed    Scheduled Meds:  Current Facility-Administered Medications:  acetaminophen 650 mg Oral Q6H PRN Elliot Bar MD    amLODIPine 5 mg Oral Daily Bacilio Dominguez MD    apixaban 2 5 mg Oral BID Elliot Bar MD    atorvastatin 20 mg Oral Daily Elliot Bar MD    azaTHIOprine 50 mg Oral Daily Grecia Cooney MD    cefTRIAXone 2,000 mg Intravenous Q24H Luisa Martinez DO Last Rate: 2,000 mg (03/11/18 1317)   donepezil 10 mg Oral HS Grecia Cooney MD    gemfibrozil 600 mg Oral BID AC Grecia Cooney MD    hydrocortisone 25 mg Rectal BID Elliot Bar MD    insulin lispro 1-6 Units Subcutaneous HS Macrina Hunt PA-C    insulin lispro 2-12 Units Subcutaneous TID AC Elliot Bar MD    levETIRAcetam 500 mg Oral BID Elliot Bar MD    lisinopril 10 mg Oral Daily Grecia Cooney MD    LORazepam 0 5 mg Oral Q8H PRN Elliot Bar MD    mesalamine 500 mg Oral Daily Grecia Cooney MD    risperiDONE 0 5 mg Oral Q8H PRN Tere Davis PA-C    sertraline 50 mg Oral HS Grecia Cooney MD    sodium chloride 75 mL/hr Intravenous Continuous Elliot Bar MD Last Rate: 75 mL/hr (03/11/18 0100)     PRN Meds:   acetaminophen    LORazepam    risperiDONE    Physical Exam:   Objective   Vitals:   Vitals:    03/11/18 0801   BP: (!) 181/79 Pulse: 76   Resp: 18   Temp: 97 7 °F (36 5 °C)   SpO2: 96%   ,Body mass index is 33 91 kg/m²  Patient was examined in bed  GENERAL:  Cooperative in no acute distress  Well-developed and well-nourished    HEAD and NECK   Head is atraumatic normocephalic with no lesions or masses  Neck is supple with full range of motion    CARDIOVASCULAR  Carotid Arteries-no carotid bruits  NEUROLOGIC:  Mental Status-the patient is awake alert and oriented to a hospital in [facility]  He could not tell me the month date or year  He is able to repeat but has difficulty naming   Cranial Nerves: Visual fields are difficult to assess, question of a right visual field deficit to threat  Discs are flat  Extraocular movements are full without nystagmus  Pupils are 2-1/2 mm and reactive  Face is symmetrical to light touch  Movements of facial expression reveal a mild right facial asymmetry  Hearing is normal to finger rub bilaterally  Soft palate lifts symmetrically  Shoulder shrug is symmetrical  Tongue is midline without atrophy  Motor:  A bilateral drift is noted on arm extension  Strength is full in the upper and lower extremities with normal bulk and tone  Sensory:  Is difficult to assess given current mental status  Coordination: Finger to nose testing is performed accurately   Romberg and gait were not tested  Reflexes:  Trace to 1 Toes are upgoing bilaterally          Lab Results:Lab Results:   CBC:   Results from last 7 days  Lab Units 03/11/18  0526 03/10/18  1209   WBC Thousand/uL 6 90 6 21   RBC Million/uL 4 74 4 94   HEMOGLOBIN g/dL 13 9 14 4   HEMATOCRIT % 42 1 43 9   MCV fL 89 89   PLATELETS Thousands/uL 164 153   , BMP/CMP:   Results from last 7 days  Lab Units 03/11/18  0526 03/10/18  1506   SODIUM mmol/L 137 136   POTASSIUM mmol/L 3 9 3 8   CHLORIDE mmol/L 99* 99*   CO2 mmol/L 22 27   ANION GAP mmol/L 16* 10   BUN mg/dL 13 12   CREATININE mg/dL 0 71 0 87   GLUCOSE RANDOM mg/dL 271* 291*   CALCIUM mg/dL 9 0 9 2   AST U/L 20 13   ALT U/L 16 17   ALK PHOS U/L 68 72   TOTAL PROTEIN g/dL 7 1 7 6   BILIRUBIN TOTAL mg/dL 0 80 0 70   EGFR ml/min/1 73sq m 90 83   , Urinalysis:   Results from last 7 days  Lab Units 03/11/18  1140 03/10/18  1213   COLOR UA  Yellow Yellow   CLARITY UA  Slightly Cloudy Cloudy   SPEC GRAV UA  1 025 1 025   PH UA  6 5 5 5   LEUKOCYTES UA  Small* Small*   NITRITE UA  Negative Negative   PROTEIN UA mg/dl 100 (2+)* 30 (1+)*   GLUCOSE UA mg/dl 500 (1/2%)* >=1000 (1%)*   KETONES UA mg/dl 15 (1+)* 15 (1+)*   BILIRUBIN UA  Small* Negative   BLOOD UA  Large* Large*   , Medication Drug Levels:       Invalid input(s): CARBAMAZEPINE,  PHENOBARB, LACOSAMIDE, OXCARBAZEPINE  I have personally reviewed pertinent reports  EEG, Echo, Pathology, and Other Studies: I have personally reviewed pertinent films in PACS    Family, was not present at the bedside for history and examination, I did speak to his wife on the telephone  Assessment:  1  Change in mental status - suspect secondary to current UTI in the face of multi-infarct dementia  2  Multiple CVAs including left middle cerebral artery distribution, right anterior middle cerebral artery distribution and left cerebellar infarctions secondary to atrial fibrillation  3  Single seizure associated with CVA 4 years ago presently on Keppra  4   Dementia - multi-infarct versus Alzheimer's dementia    Plan:  Continue current anticoagulation as well of supportive therapy with antibiotics regarding his urinary tract infection adjusting appropriately as per sensitivities  Continue Keppra, Keppra level pending  Colten Valle MD  3/11/2018,2:44 PM    "This note has been constructed using a voice recognition system "

## 2018-03-11 NOTE — PROGRESS NOTES
[facility] Progress Note  Patient: Eddie Saleh 65 y o  male   MRN: [id number]  PCP: Belem Lovett DO  Unit/Bed#: [facility] [place] Encounter: [id number]  Date Of Visit: 03/11/18    Assessment:    Principal Problem:    Acute cystitis with hematuria  Active Problems:    Diabetes mellitus ([facility] )    Paroxysmal atrial fibrillation (HCC)    HTN (hypertension)    Hyperlipidemia    Weakness    Coronary artery disease involving native heart with angina pectoris ([facility] )    History of CVA in adulthood    Aphasia as late effect of cerebrovascular accident    Seizure disorder ([facility] )      Plan:    · AMS: possibly due to toxic metabolic encephalopathy in the setting of UTI   · Questionable syncope: patient was found down on the floor   ? Possibly 2/2 underlying Afib vs  CAD vs  CVA vs  UTI vs  Seizure disorder   ? TNI negative x 3  ? For 2D ECHO  ? Abx as below  ? May need stress vs and/or EEG   ? Neurology and cardiology consults   · Pyuria: likely 2/2 UTI - acute cystitis w/hematuria   ? F/u UCx   ? F/u BCUL x 2   ? Afebrile, no leukocytosis   ? C/w Ceftriaxone    ? Decrease IVF NS to 50 mL/hr   · Weakness: likely 2/2 above     Plan for Additional Problems:   · HTN: c/w Lisinopril   · HLP: c/w Lipitor and Gemfibrozil   · CAD s/p CABG: c/w Lisinopril and Gemfibrozil   ? Likely not on ASA due to the fact patient is on Eliquis  · Afib: Currently, NSR        Not on a rate controlling agent  ? c/w Eliquis   · T2DM w/hyperglycemia: on Sitagliptin, Metformin and Pioglitazone outpatient - will hold  ? c/w FAITH AC/HS while inpatient - increase to algorithm 4   · Seizure disorder: f/u Keppra level   ? c/w Keppra  · Hx of CVA: c/w Eliquis and Lipitor  · UC: c/w Imuran and Apriso     VTE Pharmacologic Prophylaxis:   Pharmacologic: Apixaban (Eliquis)  Mechanical VTE Prophylaxis in Place: Yes    Patient Centered Rounds: I have performed bedside rounds with nursing staff today      Discussions with Specialists or Other Care Team Provider: None    Education and Discussions with Family / Patient: Will call patient's wife     Time Spent for Care: 30 minutes  More than 50% of total time spent on counseling and coordination of care as described above  Current Length of Stay: 1 day(s)    Current Patient Status: Inpatient   Certification Statement: The patient will continue to require additional inpatient hospital stay due to IV antibiotics, f/u UCx    Discharge Plan / Estimated Discharge Date: Likely STR once medically stable     Code Status: Level 3 - DNAR and DNI      Subjective:   No acute overnight events  Aphasic at baseline   Sleeping     Objective:     Vitals:   Temp (24hrs), Av 6 °F (36 4 °C), Min:97 5 °F (36 4 °C), Max:97 7 °F (36 5 °C)    HR:  [65-79] 76  Resp:  [18-27] 18  BP: (146-181)/(66-79) 181/79  SpO2:  [91 %-98 %] 96 %  Body mass index is 33 91 kg/m²  Input and Output Summary (last 24 hours): Intake/Output Summary (Last 24 hours) at 18 1022  Last data filed at 18 0559   Gross per 24 hour   Intake             2020 ml   Output             1950 ml   Net               70 ml       Physical Exam:     Physical Exam   Constitutional: No distress  HENT:   Head: Normocephalic and atraumatic  Nose: Nose normal    Mouth/Throat: Oropharynx is clear and moist    Neck: Neck supple  No JVD present  Cardiovascular: Normal rate, regular rhythm and normal heart sounds  Exam reveals no gallop and no friction rub  No murmur heard  Prior sternotomy incision well healed   Pulmonary/Chest: Effort normal and breath sounds normal  No respiratory distress  He has no wheezes  He has no rales  He exhibits no tenderness  Abdominal: Soft  Bowel sounds are normal  He exhibits no distension  There is no tenderness  There is no rebound and no guarding  Genitourinary:   Genitourinary Comments: +glez catheter draining clear, yellow colored urine   Musculoskeletal: He exhibits no edema     Neurological: No cranial nerve deficit  Aphasic at baseline   Skin: Skin is warm and dry  No rash noted  Psychiatric: He has a normal mood and affect  Vitals reviewed  Additional Data:     Labs:      Results from last 7 days  Lab Units 03/11/18  0526 03/10/18  1209   WBC Thousand/uL 6 90 6 21   HEMOGLOBIN g/dL 13 9 14 4   HEMATOCRIT % 42 1 43 9   PLATELETS Thousands/uL 164 153   NEUTROS PCT %  --  84*   LYMPHS PCT %  --  8*   MONOS PCT %  --  7   EOS PCT %  --  0       Results from last 7 days  Lab Units 03/11/18  0526   SODIUM mmol/L 137   POTASSIUM mmol/L 3 9   CHLORIDE mmol/L 99*   CO2 mmol/L 22   BUN mg/dL 13   CREATININE mg/dL 0 71   CALCIUM mg/dL 9 0   TOTAL PROTEIN g/dL 7 1   BILIRUBIN TOTAL mg/dL 0 80   ALK PHOS U/L 68   ALT U/L 16   AST U/L 20   GLUCOSE RANDOM mg/dL 271*           * I Have Reviewed All Lab Data Listed Above  * Additional Pertinent Lab Tests Reviewed:  All Labs Within Last 24 Hours Reviewed    Imaging:    Imaging Reports Reviewed Today Include: None  Imaging Personally Reviewed by Myself Includes:  None    Recent Cultures (last 7 days):           Last 24 Hours Medication List:     Current Facility-Administered Medications:  acetaminophen 650 mg Oral Q6H PRN Rikki Drew, MD    apixaban 2 5 mg Oral BID Rikki Drew, MD    atorvastatin 20 mg Oral Daily Rikki Drew, MD    azaTHIOprine 50 mg Oral Daily Grecia Bush MD    cefTRIAXone 2,000 mg Intravenous Q24H Surekha Truijllo DO Last Rate: 2,000 mg (03/10/18 1500)   donepezil 10 mg Oral HS Grecia Bush MD    gemfibrozil 600 mg Oral BID AC Grecia Bush MD    hydrocortisone 25 mg Rectal BID Rikki Drew, MD    insulin lispro 1-6 Units Subcutaneous TID AC Macrina Hunt PA-C    insulin lispro 1-6 Units Subcutaneous HS Macrina Hunt PA-C    levETIRAcetam 500 mg Oral BID Rikki Drew, MD    lisinopril 10 mg Oral Daily Grecia Bush MD    LORazepam 0 5 mg Oral Q8H PRN Rikki Drew, MD    mesalamine 500 mg Oral Daily Rikki Drew, MD    risperiDONE 0 5 mg Oral Q8H PRN Josie Louise GIRISH Hunt    sertraline 50 mg Oral HS Grecia Hernández MD    sodium chloride 75 mL/hr Intravenous Continuous Doreen Scanlon MD Last Rate: 75 mL/hr (03/11/18 0100)        Today, Patient Was Seen By: Doreen Scanlon MD    ** Please Note: This note has been constructed using a voice recognition system   **

## 2018-03-12 ENCOUNTER — APPOINTMENT (INPATIENT)
Dept: NON INVASIVE DIAGNOSTICS | Facility: HOSPITAL | Age: 78
DRG: 637 | End: 2018-03-12
Payer: MEDICARE

## 2018-03-12 PROBLEM — E11.65 TYPE 2 DIABETES MELLITUS WITH HYPERGLYCEMIA, WITHOUT LONG-TERM CURRENT USE OF INSULIN (HCC): Chronic | Status: ACTIVE | Noted: 2017-02-15

## 2018-03-12 LAB
ANION GAP SERPL CALCULATED.3IONS-SCNC: 15 MMOL/L (ref 4–13)
ATRIAL RATE: 73 BPM
BACTERIA UR CULT: NORMAL
BASOPHILS # BLD MANUAL: 0 THOUSAND/UL (ref 0–0.1)
BASOPHILS NFR MAR MANUAL: 0 % (ref 0–1)
BUN SERPL-MCNC: 11 MG/DL (ref 5–25)
CALCIUM SERPL-MCNC: 8.6 MG/DL (ref 8.3–10.1)
CHLORIDE SERPL-SCNC: 99 MMOL/L (ref 100–108)
CO2 SERPL-SCNC: 21 MMOL/L (ref 21–32)
CREAT SERPL-MCNC: 0.71 MG/DL (ref 0.6–1.3)
EOSINOPHIL # BLD MANUAL: 0 THOUSAND/UL (ref 0–0.4)
EOSINOPHIL NFR BLD MANUAL: 0 % (ref 0–6)
ERYTHROCYTE [DISTWIDTH] IN BLOOD BY AUTOMATED COUNT: 14.6 % (ref 11.6–15.1)
EST. AVERAGE GLUCOSE BLD GHB EST-MCNC: 272 MG/DL
GFR SERPL CREATININE-BSD FRML MDRD: 90 ML/MIN/1.73SQ M
GLUCOSE SERPL-MCNC: 254 MG/DL (ref 65–140)
GLUCOSE SERPL-MCNC: 303 MG/DL (ref 65–140)
GLUCOSE SERPL-MCNC: 357 MG/DL (ref 65–140)
GLUCOSE SERPL-MCNC: 484 MG/DL (ref 65–140)
GLUCOSE SERPL-MCNC: 69 MG/DL (ref 65–140)
HBA1C MFR BLD: 11.1 % (ref 4.2–6.3)
HCT VFR BLD AUTO: 44.3 % (ref 36.5–49.3)
HGB BLD-MCNC: 14.5 G/DL (ref 12–17)
LYMPHOCYTES # BLD AUTO: 0.09 THOUSAND/UL (ref 0.6–4.47)
LYMPHOCYTES # BLD AUTO: 1 % (ref 14–44)
MCH RBC QN AUTO: 29.1 PG (ref 26.8–34.3)
MCHC RBC AUTO-ENTMCNC: 32.7 G/DL (ref 31.4–37.4)
MCV RBC AUTO: 89 FL (ref 82–98)
MONOCYTES # BLD AUTO: 0.26 THOUSAND/UL (ref 0–1.22)
MONOCYTES NFR BLD: 3 % (ref 4–12)
NEUTROPHILS # BLD MANUAL: 8.26 THOUSAND/UL (ref 1.85–7.62)
NEUTS SEG NFR BLD AUTO: 96 % (ref 43–75)
NRBC BLD AUTO-RTO: 0 /100 WBCS
P AXIS: 45 DEGREES
PLATELET # BLD AUTO: 176 THOUSANDS/UL (ref 149–390)
PLATELET BLD QL SMEAR: ADEQUATE
PMV BLD AUTO: 11.5 FL (ref 8.9–12.7)
POTASSIUM SERPL-SCNC: 3.7 MMOL/L (ref 3.5–5.3)
PR INTERVAL: 188 MS
QRS AXIS: 34 DEGREES
QRSD INTERVAL: 106 MS
QT INTERVAL: 424 MS
QTC INTERVAL: 467 MS
RBC # BLD AUTO: 4.98 MILLION/UL (ref 3.88–5.62)
SODIUM SERPL-SCNC: 135 MMOL/L (ref 136–145)
T WAVE AXIS: 35 DEGREES
TOTAL CELLS COUNTED SPEC: 100
VENTRICULAR RATE: 73 BPM
WBC # BLD AUTO: 8.6 THOUSAND/UL (ref 4.31–10.16)

## 2018-03-12 PROCEDURE — 82948 REAGENT STRIP/BLOOD GLUCOSE: CPT

## 2018-03-12 PROCEDURE — 99222 1ST HOSP IP/OBS MODERATE 55: CPT | Performed by: INTERNAL MEDICINE

## 2018-03-12 PROCEDURE — 85007 BL SMEAR W/DIFF WBC COUNT: CPT | Performed by: INTERNAL MEDICINE

## 2018-03-12 PROCEDURE — 80048 BASIC METABOLIC PNL TOTAL CA: CPT | Performed by: INTERNAL MEDICINE

## 2018-03-12 PROCEDURE — 97163 PT EVAL HIGH COMPLEX 45 MIN: CPT

## 2018-03-12 PROCEDURE — 93010 ELECTROCARDIOGRAM REPORT: CPT | Performed by: INTERNAL MEDICINE

## 2018-03-12 PROCEDURE — 85027 COMPLETE CBC AUTOMATED: CPT | Performed by: INTERNAL MEDICINE

## 2018-03-12 PROCEDURE — 83036 HEMOGLOBIN GLYCOSYLATED A1C: CPT | Performed by: INTERNAL MEDICINE

## 2018-03-12 PROCEDURE — G8978 MOBILITY CURRENT STATUS: HCPCS

## 2018-03-12 PROCEDURE — 93306 TTE W/DOPPLER COMPLETE: CPT | Performed by: INTERNAL MEDICINE

## 2018-03-12 PROCEDURE — 99232 SBSQ HOSP IP/OBS MODERATE 35: CPT | Performed by: INTERNAL MEDICINE

## 2018-03-12 PROCEDURE — C8929 TTE W OR WO FOL WCON,DOPPLER: HCPCS

## 2018-03-12 PROCEDURE — G8979 MOBILITY GOAL STATUS: HCPCS

## 2018-03-12 RX ORDER — LORAZEPAM 2 MG/ML
0.5 INJECTION INTRAMUSCULAR ONCE
Status: COMPLETED | OUTPATIENT
Start: 2018-03-12 | End: 2018-03-12

## 2018-03-12 RX ORDER — INSULIN GLARGINE 100 [IU]/ML
20 INJECTION, SOLUTION SUBCUTANEOUS
Status: DISCONTINUED | OUTPATIENT
Start: 2018-03-12 | End: 2018-03-12

## 2018-03-12 RX ORDER — INSULIN GLARGINE 100 [IU]/ML
10 INJECTION, SOLUTION SUBCUTANEOUS ONCE
Status: COMPLETED | OUTPATIENT
Start: 2018-03-12 | End: 2018-03-12

## 2018-03-12 RX ORDER — INSULIN GLARGINE 100 [IU]/ML
25 INJECTION, SOLUTION SUBCUTANEOUS
Status: DISCONTINUED | OUTPATIENT
Start: 2018-03-12 | End: 2018-03-13

## 2018-03-12 RX ADMIN — CEFTRIAXONE 2000 MG: 2 INJECTION, SOLUTION INTRAVENOUS at 13:00

## 2018-03-12 RX ADMIN — LEVETIRACETAM 500 MG: 500 TABLET ORAL at 08:00

## 2018-03-12 RX ADMIN — INSULIN LISPRO 12 UNITS: 100 INJECTION, SOLUTION INTRAVENOUS; SUBCUTANEOUS at 16:00

## 2018-03-12 RX ADMIN — GEMFIBROZIL 600 MG: 600 TABLET, FILM COATED ORAL at 06:10

## 2018-03-12 RX ADMIN — HYDROCORTISONE ACETATE 25 MG: 25 SUPPOSITORY RECTAL at 17:49

## 2018-03-12 RX ADMIN — LISINOPRIL 10 MG: 10 TABLET ORAL at 08:00

## 2018-03-12 RX ADMIN — MESALAMINE 500 MG: 250 CAPSULE ORAL at 12:00

## 2018-03-12 RX ADMIN — RISPERIDONE 0.5 MG: 0.25 TABLET ORAL at 01:01

## 2018-03-12 RX ADMIN — PERFLUTREN 0.6 ML/MIN: 6.52 INJECTION, SUSPENSION INTRAVENOUS at 15:40

## 2018-03-12 RX ADMIN — LORAZEPAM 0.5 MG: 0.5 TABLET ORAL at 19:17

## 2018-03-12 RX ADMIN — MESALAMINE 500 MG: 250 CAPSULE ORAL at 08:00

## 2018-03-12 RX ADMIN — INSULIN GLARGINE 10 UNITS: 100 INJECTION, SOLUTION SUBCUTANEOUS at 10:55

## 2018-03-12 RX ADMIN — SODIUM CHLORIDE 75 ML/HR: 0.9 INJECTION, SOLUTION INTRAVENOUS at 05:42

## 2018-03-12 RX ADMIN — AZATHIOPRINE 50 MG: 50 TABLET ORAL at 08:00

## 2018-03-12 RX ADMIN — LEVETIRACETAM 500 MG: 500 TABLET ORAL at 17:49

## 2018-03-12 RX ADMIN — HYDROCORTISONE ACETATE 25 MG: 25 SUPPOSITORY RECTAL at 08:00

## 2018-03-12 RX ADMIN — MESALAMINE 500 MG: 250 CAPSULE ORAL at 17:48

## 2018-03-12 RX ADMIN — ATORVASTATIN CALCIUM 20 MG: 20 TABLET, FILM COATED ORAL at 08:00

## 2018-03-12 RX ADMIN — APIXABAN 2.5 MG: 2.5 TABLET, FILM COATED ORAL at 17:49

## 2018-03-12 RX ADMIN — LORAZEPAM 0.5 MG: 2 INJECTION INTRAMUSCULAR; INTRAVENOUS at 04:07

## 2018-03-12 RX ADMIN — AMLODIPINE BESYLATE 5 MG: 5 TABLET ORAL at 08:00

## 2018-03-12 RX ADMIN — INSULIN LISPRO 8 UNITS: 100 INJECTION, SOLUTION INTRAVENOUS; SUBCUTANEOUS at 16:00

## 2018-03-12 RX ADMIN — INSULIN LISPRO 4 UNITS: 100 INJECTION, SOLUTION INTRAVENOUS; SUBCUTANEOUS at 06:55

## 2018-03-12 RX ADMIN — GEMFIBROZIL 600 MG: 600 TABLET, FILM COATED ORAL at 16:30

## 2018-03-12 RX ADMIN — INSULIN LISPRO 6 UNITS: 100 INJECTION, SOLUTION INTRAVENOUS; SUBCUTANEOUS at 12:00

## 2018-03-12 RX ADMIN — APIXABAN 2.5 MG: 2.5 TABLET, FILM COATED ORAL at 08:00

## 2018-03-12 NOTE — TELEMEDICINE
Consultation - Princess Haro 65 y o  male MRN: [id number]    Unit/Bed#: -01 Encounter: [id number]      Assessment/Plan     Assessment: This is a 65y o -year-old male with type 2 diabetes with hyperglycemia, acute cystitis  with hematuria, aphasic secondary to CVA and dementia    Plan:  Discussed with the wife as well as medical team that given his degree of hyperglycemia insulin therapy is the optimal choice  Given his age and comorbidities tight control not beneficial   Aim to keep blood sugars less than 200s to decrease the risk of dehydration and infections  For now I agree with stopping oral hypoglycemics  Start Lantus 25 units at bedtime, Humalog 8 units before meals  For now it appears that patient may be going to short-term rehab/facility if the wife is not able to manage his care at home  Acute cystitis with hematuria-patient on IV antibiotics and IV fluids-urology has been consulted for recurrent UTIs  Aphasia secondary to CVA/dementia-wife concerned about managing his care at home-physical therapy recommending short-term skilled PT      CC: Diabetes Consult    History of Present Illness     Tele consult is equipment not working so spoke to the wife on the phone  HPI: Princess Haro is a 65y o  year old male with type  2 Diabetes who was initially admitted to the hospital with altered mental status possibly due to toxic metabolic encephalopathy in setting of UTI and is currently being treated for acute cystitis with hematuria  His sugars since admission have ranged anywhere between 180s to 350s  Endocrine consult is requested for management of diabetes  Patient is aphasic after stroke and also has dementia-history is obtained from the wife over the phone-he has history of type 2 diabetes for many years, is treated with oral hypoglycemics  Wife is the primary caregiver and checks his blood sugars about once a week  Usually fasting blood sugars are above 300s    Patient's diet is variable and he is not able to communicate secondary to aphasic    Consults    Review of Systems   Unable to perform ROS: Dementia       Historical Information   Past Medical History:   Diagnosis Date    Anxiety     Cardiac disease     Dementia     [facility] mellitus ([facility] [address] [facility]     Hyperlipidemia     Hypertension     Polio     Psychiatric disorder     Seizures ([facility] [address] )     1 seizure     Stroke [facility])      Past Surgical History:   Procedure Laterality Date    APPENDECTOMY      CARDIAC SURGERY      [place] ESOPHAGOGASTRODUODENOSCOPY TRANSORAL DIAGNOSTIC N/A 3/21/2017    Procedure: ESOPHAGOGASTRODUODENOSCOPY (EGD); Surgeon: Najma Chavez MD;  Location: [facility];   Service: Gastroenterology    ROTATOR CUFF REPAIR Left      Social History   History   Alcohol Use No     History   Drug Use No     History   Smoking Status    Former Smoker   Smokeless Tobacco    Never Used     Family History:  Unobtainable secondary to patient's aphasia and dementia    Meds/Allergies   Current Facility-Administered Medications   Medication Dose Route Frequency Provider Last Rate Last Dose    acetaminophen (TYLENOL) tablet 650 mg  650 mg Oral Q6H PRN Rikki Drew, MD        amLODIPine (NORVASC) tablet 5 mg  5 mg Oral Daily Kishan Lovett MD   5 mg at 03/12/18 0800    apixaban (ELIQUIS) tablet 2 5 mg  2 5 mg Oral BID Rikki Drew, MD   2 5 mg at 03/12/18 0800    atorvastatin (LIPITOR) tablet 20 mg  20 mg Oral Daily Rikki Drew, MD   20 mg at 03/12/18 0800    azaTHIOprine (IMURAN) tablet 50 mg  50 mg Oral Daily Rikki Drew, MD   50 mg at 03/12/18 0800    cefTRIAXone (ROCEPHIN) IVPB (premix) 2,000 mg  2,000 mg Intravenous Q24H Surekha Trujillo  mL/hr at 03/11/18 1317 2,000 mg at 03/11/18 1317    donepezil (ARICEPT) tablet 10 mg  10 mg Oral HS Rikki Drew, MD   10 mg at 03/11/18 2105    gemfibrozil (LOPID) tablet 600 mg  600 mg Oral BID AC Rikki Drew, MD   600 mg at 03/12/18 0610    hydrocortisone (ANUSOL-HC) rectal suppository 25 mg  25 mg Rectal BID Joseph Marsh MD   25 mg at 03/12/18 0800    insulin glargine (LANTUS) subcutaneous injection 20 Units  20 Units Subcutaneous HS Joseph Marsh MD        insulin lispro (HumaLOG) 100 units/mL subcutaneous injection 1-6 Units  1-6 Units Subcutaneous HS Reginald Argueta PA-C   4 Units at 03/11/18 2147    insulin lispro (HumaLOG) 100 units/mL subcutaneous injection 2-12 Units  2-12 Units Subcutaneous TID AC Joseph Marsh MD   6 Units at 03/12/18 1200    levETIRAcetam (KEPPRA) tablet 500 mg  500 mg Oral BID Joseph Marsh MD   500 mg at 03/12/18 0800    lisinopril (ZESTRIL) tablet 10 mg  10 mg Oral Daily Joseph Marsh MD   10 mg at 03/12/18 0800    LORazepam (ATIVAN) tablet 0 5 mg  0 5 mg Oral Q8H PRN Joseph Marsh MD   0 5 mg at 03/11/18 2038    mesalamine (PENTASA) ER capsule 500 mg  500 mg Oral 4x Daily Joseph Marsh MD   500 mg at 03/12/18 1200    risperiDONE (RisperDAL) tablet 0 5 mg  0 5 mg Oral Q8H PRN Reginald Argueta PA-C   0 5 mg at 03/12/18 0101    sertraline (ZOLOFT) tablet 50 mg  50 mg Oral HS Joseph Marsh MD   50 mg at 03/11/18 2105    sodium chloride 0 9 % infusion  50 mL/hr Intravenous Continuous Joseph Marsh MD 50 mL/hr at 03/12/18 1022 50 mL/hr at 03/12/18 1022     No Known Allergies    Objective   Vitals: Blood pressure 141/70, pulse 72, temperature 97 5 °F (36 4 °C), temperature source Axillary, resp  rate 16, height 5' 7" (1 702 m), weight 96 6 kg (212 lb 15 4 oz), SpO2 94 %  Intake/Output Summary (Last 24 hours) at 03/12/18 1531  Last data filed at 03/12/18 1320   Gross per 24 hour   Intake              980 ml   Output             2300 ml   Net            -1320 ml     Invasive Devices     Peripheral Intravenous Line            Peripheral IV 03/10/18 Left Forearm 1 day          Drain            Urethral Catheter Temperature probe 2 days                Physical Exam    The history was obtained from the review of the chart, family      Lab Results:     Results from last 7 days  Lab Units 03/12/18  0946   HEMOGLOBIN A1C % 11 1*     Lab Results   Component Value Date    WBC 8 60 03/12/2018    HGB 14 5 03/12/2018    HCT 44 3 03/12/2018    MCV 89 03/12/2018     03/12/2018     Lab Results   Component Value Date/Time    BUN 11 03/12/2018 05:20 AM    BUN 7 06/19/2014 06:32 AM     (L) 03/12/2018 05:20 AM     06/19/2014 06:32 AM    K 3 7 03/12/2018 05:20 AM    K 3 2 (L) 06/19/2014 06:32 AM    CL 99 (L) 03/12/2018 05:20 AM     (L) 06/19/2014 06:32 AM    CO2 21 03/12/2018 05:20 AM    CO2 26 06/19/2014 06:32 AM    CREATININE 0 71 03/12/2018 05:20 AM    CREATININE 0 57 (L) 06/19/2014 06:32 AM    AST 20 03/11/2018 05:26 AM    AST 19 06/15/2014 12:46 AM    ALT 16 03/11/2018 05:26 AM    ALT 10 06/15/2014 12:46 AM    ALB 3 4 (L) 03/11/2018 05:26 AM    ALB 3 3 (L) 06/15/2014 12:46 AM     No results for input(s): CHOL, HDL, LDL, TRIG, VLDL in the last 72 hours  No results found for: Jai Espino  POC Glucose   Date Value   03/12/2018 254 mg/dl (H)   03/12/2018 357 mg/dl (H)   03/11/2018 305 mg/dl (H)   03/11/2018 248 mg/dl (H)   03/11/2018 199 mg/dl (H)   03/11/2018 188 mg/dl (H)   03/10/2018 210 mg/dl (H)   03/10/2018 236 mg/dl (H)   03/21/2017 279 mg/dl (H)   02/18/2017 263 mg/dl (H)   06/14/2014 199 mg/dL (H)       Imaging Studies: I have personally reviewed pertinent reports  CT head without contrast   Impression:       No acute intracranial abnormality or significant change from prior   Multiple old cerebral and cerebellar infarcts  Portions of the record may have been created with voice recognition software

## 2018-03-12 NOTE — PLAN OF CARE
DISCHARGE PLANNING     Discharge to home or other facility with appropriate resources Not Progressing        INFECTION - ADULT     Absence or prevention of progression during hospitalization Not Progressing     Absence of fever/infection during neutropenic period Not Progressing        Knowledge Deficit     Patient/family/caregiver demonstrates understanding of disease process, treatment plan, medications, and discharge instructions Not Progressing        Nutrition/Hydration-ADULT     Nutrient/Hydration intake appropriate for improving, restoring or maintaining nutritional needs Not Progressing        PAIN - ADULT     Verbalizes/displays adequate comfort level or baseline comfort level Not Progressing        Potential for Falls     Patient will remain free of falls Not Progressing        Prexisting or High Potential for Compromised Skin Integrity     Skin integrity is maintained or improved Not Progressing        SAFETY ADULT     Maintain or return to baseline ADL function Not Progressing     Maintain or return mobility status to optimal level Not Progressing

## 2018-03-12 NOTE — PROGRESS NOTES
Notified MD of x 1 loose incontinent stool   Orders to continue to monitor pt for recurrent episodes and update MD

## 2018-03-12 NOTE — PROGRESS NOTES
[facility] Progress Note  Patient: Lion Hartley 65 y o  male   MRN: [id number]  PCP: Srikanth Chen DO  Unit/Bed#: [facility] Encounter: [id number]  Date Of Visit: 03/12/18    Assessment:    Principal Problem:    Acute cystitis with hematuria  Active Problems:    Diabetes mellitus ([facility] )    Paroxysmal atrial fibrillation (HCC)    HTN (hypertension)    Hyperlipidemia    Weakness    Coronary artery disease involving native heart with angina pectoris ([facility] )    History of CVA in adulthood    Aphasia as late effect of cerebrovascular accident    Seizure disorder ([facility] )      Plan:    · AMS: possibly due to toxic metabolic encephalopathy in the setting of UTI   · Questionable syncope: patient was found down on the floor   ? Possibly 2/2 underlying Afib vs  CAD vs  CVA vs  UTI vs  Seizure disorder   ? TNI negative x 3  ? F/u 2D ECHO  ? Abx as below  ? Appreciate neurology and cardiology recs   · Pyuria: likely 2/2 UTI - acute cystitis w/hematuria   ? F/u UCx   ? F/u BCUL x 2   ? Afebrile, no leukocytosis   ? C/w Ceftriaxone    ? c/w IVF NS to 50 mL/hr   ? Due to the fact that this is a recurrent UTI, will consult urology   ? Also, initial difficulty in placement of glez catheter r/o stricture   · Weakness: likely 2/2 above    PT/OT   STR   Plan for Additional Problems:   · HTN: Better controlled   ? c/w Lisinopril and Amlodipine  · HLP: c/w Lipitor and Gemfibrozil   · CAD s/p CABG: c/w Lisinopril and Gemfibrozil   ? Likely not on ASA due to the fact patient is on Eliquis  · Afib: Currently, NSR        Not on a rate controlling agent  ? c/w Eliquis   · T2DM w/hyperglycemia not on long term Insuln:   ? on Sitagliptin, Metformin and Pioglitazone outpatient - will hold  ? Previous A1c 12 1  ? Repeat A1c   ? Lantus 10 un SQ x 1 now   ? Start Lantus 20 un SQ QHS   ? c/w FAITH AC/HS algorithm 4  ?  FSBS AC/HS/2 AM   ? Endocrinology consult    · Seizure disorder: f/u Keppra level   ? c/w Keppra  · Hx of CVA: c/w Eliquis and Lipitor  · UC: c/w Imuran and Apriso     VTE Pharmacologic Prophylaxis:   Pharmacologic: Apixaban (Eliquis)  Mechanical VTE Prophylaxis in Place: Yes    Patient Centered Rounds: I have performed bedside rounds with nursing staff today  Discussions with Specialists or Other Care Team Provider: None    Education and Discussions with Family / Patient: Patient's wife - would like STR     Time Spent for Care: 30 minutes  More than 50% of total time spent on counseling and coordination of care as described above  Current Length of Stay: 2 day(s)    Current Patient Status: Inpatient   Certification Statement: The patient will continue to require additional inpatient hospital stay due to IV antibiotics, f/u UCx    Discharge Plan / Estimated Discharge Date: Likely STR once medically stable     Code Status: Level 3 - DNAR and DNI      Subjective:   No acute overnight events  Aphasic at baseline   Sitting up in bed, drinking coffee  Follows commands from wife     Objective:     Vitals:   Temp (24hrs), Av 8 °F (36 6 °C), Min:97 5 °F (36 4 °C), Max:98 3 °F (36 8 °C)    HR:  [72-80] 72  Resp:  [16-18] 16  BP: (141-157)/(60-70) 141/70  SpO2:  [90 %-94 %] 94 %  Body mass index is 33 35 kg/m²  Input and Output Summary (last 24 hours): Intake/Output Summary (Last 24 hours) at 18 1009  Last data filed at 18 0630   Gross per 24 hour   Intake              980 ml   Output             1800 ml   Net             -820 ml       Physical Exam:     Physical Exam   Constitutional: No distress  Drinking coffee   HENT:   Head: Normocephalic and atraumatic  Nose: Nose normal    Mouth/Throat: Oropharynx is clear and moist    Neck: Neck supple  No JVD present  Cardiovascular: Normal rate, regular rhythm and normal heart sounds  Exam reveals no gallop and no friction rub  No murmur heard    Prior sternotomy incision well healed   Pulmonary/Chest: Effort normal and breath sounds normal  No respiratory distress  He has no wheezes  He has no rales  He exhibits no tenderness  Abdominal: Soft  Bowel sounds are normal  He exhibits no distension  There is no tenderness  There is no rebound and no guarding  Genitourinary:   Genitourinary Comments: +glez catheter draining clear, yellow colored urine   Musculoskeletal: He exhibits no edema  Neurological: No cranial nerve deficit  Awake, follows commands  Aphasic at baseline   Skin: Skin is warm and dry  No rash noted  Psychiatric: He has a normal mood and affect  Vitals reviewed  Additional Data:     Labs:      Results from last 7 days  Lab Units 03/12/18  0520  03/10/18  1209   WBC Thousand/uL 8 60  < > 6 21   HEMOGLOBIN g/dL 14 5  < > 14 4   HEMATOCRIT % 44 3  < > 43 9   PLATELETS Thousands/uL 176  < > 153   NEUTROS PCT %  --   --  84*   LYMPHS PCT %  --   --  8*   LYMPHO PCT % 1*  --   --    MONOS PCT %  --   --  7   MONO PCT MAN % 3*  --   --    EOS PCT %  --   --  0   EOSINO PCT MANUAL % 0  --   --    < > = values in this interval not displayed  Results from last 7 days  Lab Units 03/12/18  0520 03/11/18  0526   SODIUM mmol/L 135* 137   POTASSIUM mmol/L 3 7 3 9   CHLORIDE mmol/L 99* 99*   CO2 mmol/L 21 22   BUN mg/dL 11 13   CREATININE mg/dL 0 71 0 71   CALCIUM mg/dL 8 6 9 0   TOTAL PROTEIN g/dL  --  7 1   BILIRUBIN TOTAL mg/dL  --  0 80   ALK PHOS U/L  --  68   ALT U/L  --  16   AST U/L  --  20   GLUCOSE RANDOM mg/dL 303* 271*           * I Have Reviewed All Lab Data Listed Above  * Additional Pertinent Lab Tests Reviewed: All Labs Within Last 24 Hours Reviewed    Imaging:    Imaging Reports Reviewed Today Include: None  Imaging Personally Reviewed by Myself Includes:  None    Recent Cultures (last 7 days):       Results from last 7 days  Lab Units 03/10/18  1216 03/10/18  1209   BLOOD CULTURE  No Growth at 24 hrs  No Growth at 24 hrs         Last 24 Hours Medication List:     Current Facility-Administered Medications:  acetaminophen 650 mg Oral Q6H PRN Viky Diaz MD    amLODIPine 5 mg Oral Daily Jazzy Castillo MD    apixaban 2 5 mg Oral BID Viky Diaz MD    atorvastatin 20 mg Oral Daily Viky Diaz MD    azaTHIOprine 50 mg Oral Daily Grecia Ramsay MD    cefTRIAXone 2,000 mg Intravenous Q24H Tamy Oliveros DO Last Rate: 2,000 mg (03/11/18 1317)   donepezil 10 mg Oral HS Grecia Ramsay MD    gemfibrozil 600 mg Oral BID AC Grecia Ramsay MD    hydrocortisone 25 mg Rectal BID Viky Diaz MD    insulin glargine 20 Units Subcutaneous HS Grecia Ramsay MD    insulin lispro 1-6 Units Subcutaneous HS Macrina Hunt PA-C    insulin lispro 2-12 Units Subcutaneous TID AC Viky Diaz MD    levETIRAcetam 500 mg Oral BID Viky Diaz MD    lisinopril 10 mg Oral Daily Grecia Ramsay MD    LORazepam 0 5 mg Oral Q8H PRN Viky Diaz MD    mesalamine 500 mg Oral 4x Daily Grecia Ramsay MD    risperiDONE 0 5 mg Oral Q8H PRN Rosa Bsas PA-C    sertraline 50 mg Oral HS Grecia Ramsay MD    sodium chloride 75 mL/hr Intravenous Continuous Viky Diaz MD Last Rate: 75 mL/hr (03/12/18 0542)        Today, Patient Was Seen By: Viky Diaz MD    ** Please Note: This note has been constructed using a voice recognition system   **

## 2018-03-12 NOTE — PLAN OF CARE
Problem: PHYSICAL THERAPY ADULT  Goal: Performs mobility at highest level of function for planned discharge setting  See evaluation for individualized goals  Treatment/Interventions: Functional transfer training, LE strengthening/ROM, Elevations, Therapeutic exercise, Endurance training, Patient/family training, Equipment eval/education, Bed mobility, Gait training, Spoke to nursing          See flowsheet documentation for full assessment, interventions and recommendations  Prognosis: Fair  Problem List: Decreased strength, Decreased endurance, Impaired balance, Decreased mobility, Decreased cognition, Decreased safety awareness, Impaired judgement  Assessment: pt is a 78y/o m who presents to [facility] p being found down on floor by spouse  pt currently undergoing workup for UTI  PMH significant for dementia c behavioral disturbance, aphasia, CVA, seizure disorider, DM 2, CAD, + a-fib  pt extremely poor historian c most hx obtained from H&P  at baseline, pt requires (A) from spouse c ADLs + functional mobility c RW  reports living in a 2 story home  spouse admits to difficulty cont to care for pt at home  upon arrival for eval, pt sitting on BSC stating, "I need the bathroom"  upon standing from [facility] pt became incontinent of stool requiring (A) c pericare  required min (A)x2 for functional mobility tasks 2* significant deficits in strength, balance, gait quality, cognition + activity tolerance noted in PT exam above  Barthel Index 20/100  mod verbal cues required for safety c transitions  ambulated 3' to bed c RW limited by fatigue + cognition  nsg present at end of session  would benefit from skilled PT to maximize functional mobility + improve quality of life  upon d/c, recommend STR  PT eval of high complexity 2* unstable med status c pt requiring ongoing medical management 2* acute cystitis c hematuria   pt c multiple co-morbidities including dementia c behavioral disturbance, old CVA c residual aphasia, + seizure disorder  pt requires min (A)x2 for mobility tasks 2* significant mobility deficits above  pt also c impaired safety awareness  currently c multiple lines  resides c spouse who is unable to cont to care for pt  Barriers to Discharge: Decreased caregiver support, Inaccessible home environment     Recommendation: Short-term skilled PT     PT - OK to Discharge: Yes (to STR )    See flowsheet documentation for full assessment

## 2018-03-12 NOTE — SPEECH THERAPY NOTE
Consult received, chart reviewed  Pt initially presented with AMS in setting of dementia, however, head CT is negative for acute infarct; d/w RN Jeremy Trejo who reports that pt passed dysphagia screening, has been tolerating PO intake x2 days, and p/w no apparent deficits in swallowing  Pt unlikely to benefit from speech/language assessment 2/2 acute change in mental status in the setting of dementia  Full ST eval is not warranted at this time  Please reconsult with any concerns

## 2018-03-12 NOTE — CASE MANAGEMENT
Initial Clinical Review    Admission: Date/Time/Statement: 3/10/18 @ 1255     Orders Placed This Encounter   Procedures    Inpatient Admission (expected length of stay for this patient is greater than two midnights)     Standing Status:   Standing     Number of Occurrences:   1     Order Specific Question:   Admitting Physician     Answer:   Alli Whitehead     Order Specific Question:   Level of Care     Answer:   Med Surg [16]     Order Specific Question:   Estimated length of stay     Answer:   More than 2 Midnights     Order Specific Question:   Certification     Answer:   I certify that inpatient services are medically necessary for this patient for a duration of greater than two midnights  See H&P and MD Progress Notes for additional information about the patient's course of treatment  ED: Date/Time/Mode of Arrival:   ED Arrival Information     Expected Arrival Acuity Means of Arrival Escorted By Service Admission Type    - 3/10/2018 10:02 [facility]    Arrival Complaint    Altered Mental status          Chief Complaint:   Chief Complaint   Patient presents with    Altered Mental Status     patient with history of dementia, was found on floor with blanket and pillow, foul smelling urine  History of Illness: Rowan Huffman is a 65 y o  male w/PMH of HTN, HLP, T2DM, CAD s/p CABG, seizure disorder, CVA w/residual aphasia, UC and dementia who presents with AMS  HE wound found on the floor by his wife  She states that this has happened in the past because he likes to play with the dogs  She does not believe that he fell out of bed  He wasrecently treated for an E coli UTI with Keflex  Patient is unable to provide any history; therefore, all of the information was obtained by the patient's wife and ED records  His wife states that he has progressively become become weaker the past couple of days   He is able to communicate with her but has residual aphasia from the CVA  Along with his dementia, she states that he is unable to answer questions appropriately  As per the wife, the patient states that he keeps on stating that he needs to urinate  She did not notice a foul smell; however, EMS and the ED RNs reported that his urine was foul smelling he thinks that he is dehydrated  He is able to tolerate regular consistency food without difficulty  He ambulates with assistance  His wife reports that she only gets about 1 5 hrs of sleep a night due to all of his needs  She states that he is unable to express when something is wrong  She believes that he does not feel any pain  He does not have a living will or POA  His wife states that she wants his code status to be a DNR  She understands that neither CPR nor intubation will be performed in the event his heart stops beating or he stops breathing         ED Vital Signs:   ED Triage Vitals [03/10/18 1007]   Temperature Pulse Respirations Blood Pressure SpO2   97 8 °F (36 6 °C) 71 16 159/72 96 %      Temp Source Heart Rate Source Patient Position - Orthostatic VS BP Location FiO2 (%)   Rectal Monitor Lying Right arm --      Pain Score       No Pain        Wt Readings from Last 1 Encounters:   03/12/18 96 6 kg (212 lb 15 4 oz)       Vital Signs (abnormal):   03/10/18 1415 -- 79  25 -- -- -- SD   03/10/18 1400 -- 71  27 157/72 -- -- SD   03/10/18 1230 -- 78  23 170/75 98 % --          Abnormal Labs/Diagnostic Test Results:    Leukocytes, UA Small (A) Negative     Nitrite, UA Negative Negative     Protein, UA 30 (1+) (A) Negative mg/dl     Glucose, UA >=1000 (1%) (A) Negative mg/dl     Ketones, UA 15 (1+) (A) Negative mg/dl     Urobilinogen, UA 0 2 0 2, 1 0 E U /dl E U /dl     Bilirubin, UA Negative Negative     Blood, UA Large (A) Negative    CT head- No acute intracranial abnormality or significant change from prior   Multiple old cerebral and cerebellar infarcts        ED Treatment:   Medication Administration from 03/10/2018 1002 to 03/10/2018 1605       Date/Time Order Dose Route Action Action by Comments     03/10/2018 1248 sodium chloride 0 9 % bolus 1,000 mL 0 mL Intravenous Stopped Tobias Turpin RN      03/10/2018 1100 sodium chloride 0 9 % bolus 1,000 mL 1,000 mL Intravenous [address] Tobias Turpin RN      03/10/2018 1500 cefTRIAXone (ROCEPHIN) IVPB (premix) 2,000 mg 2,000 mg Intravenous New Bag Tobias Turpin RN           Past Medical/Surgical History: Active Ambulatory Problems     Diagnosis Date Noted    Dementia with behavioral disturbance 02/15/2017    Type 2 diabetes mellitus with hyperglycemia, without long-term current use of insulin ([facility] ) 02/15/2017    Paroxysmal atrial fibrillation ([place] [address] ) 02/15/2017    HTN (hypertension) 02/15/2017    Hyperlipidemia 02/15/2017    UTI (urinary tract infection) 02/18/2017    Acute cystitis with hematuria 02/15/2018    Vesicular rash 02/15/2018    Dysuria 02/15/2018     Resolved Ambulatory Problems     Diagnosis Date Noted    Hyponatremia 02/15/2017    Altered mental status 02/15/2017    Rectal bleed 02/15/2017    CVA (cerebral vascular accident) ([facility] ) 02/15/2017     Past Medical History:   Diagnosis Date    Anxiety     Cardiac disease     Dementia     Diabetes mellitus ([place] [address] )     Hyperlipidemia     Hypertension     Polio     Psychiatric disorder     Seizures ([facility] )     Stroke ([facility] )        Admitting Diagnosis: UTI (urinary tract infection) [N39 0]  Altered mental status [R41 82]  Weakness [R53 1]  Dementia [F03 90]  Acute cystitis with hematuria [N30 01]  Atrial fibrillation, unspecified type ([place] [address] ) [I48 91]    Age/Sex: 65 y o  male    Assessment/Plan:    Hospital Problem List:    Active problems:    Plan for the Primary Problem(s):  · AMS: possibly due to toxic metabolic encephalopathy in the setting of UTI   · Questionable syncope: patient was found down on the floor   ?  Possibly 2/2 underlying Afib vs  CAD vs  CVA vs  UTI vs  Seizure disorder   ? Telemetry   ? Check TNI x 3  ? Check 2D ECHO  ? Abx as below  ? May need stress vs and/or EEG   ? Neurology and cardiology consults   · Pyuria: likely 2/2 UTI - acute cystitis w/hematuria   ? F/u UCx   ? F/u BCUL x 2   ? Afebrile, no leukocytosis   ? C/w Ceftriaxone    ? IVF NS at 75 mL/hr   · Weakness: likely 2/2 above   Plan for Additional Problems:   · HTN: resume Lisinopril   · HLP: resume Lipitor and Gemfibrozil   · CAD s/p CABG: resume Lisinopril and Gemfibrozil   ? Likely not on ASA due to the fact patient is on Eliquis  · Afib: Currently, NSR        Not on a rate controlling agent  ? Resume Eliquis   · T2DM w/hyperglycemia: on Sitagliptin, Metformin and Pioglitazone outpatient - will hold  ? Start FAITH AC/HS while inpatient   · Seizure disorder: check Keppra level   ·             resume Keppra  · Hx of CVA: resume Eliquis and Lipitor  · UC: Resume Imuran and Apriso    VTE Prophylaxis: Eliquis/ sequential compression device   Code Status: Full code  POLST: There is no POLST form on file for this patient (pre-hospital)   Anticipated Length of Stay:  Patient will be admitted on an Inpatient basis with an anticipated length of stay of  > 2 midnights     Justification for Hospital Stay: IVF NS and IV antibiotics     Admission Orders:  Scheduled Meds:   Current Facility-Administered Medications:  acetaminophen 650 mg Oral Q6H PRN Korey Novak MD    amLODIPine 5 mg Oral Daily Lana Davis MD    apixaban 2 5 mg Oral BID Korey Novak MD    atorvastatin 20 mg Oral Daily Korey Novak MD    azaTHIOprine 50 mg Oral Daily Grecia Campbell MD    cefTRIAXone 2,000 mg Intravenous Q24H James Manning DO Last Rate: 2,000 mg (03/11/18 1317)   donepezil 10 mg Oral HS Grecia Campbell MD    gemfibrozil 600 mg Oral BID AC Grecia Campbell MD    hydrocortisone 25 mg Rectal BID Korey Novak MD    insulin glargine 20 Units Subcutaneous HS Grecia Campbell MD    insulin lispro 1-6 Units Subcutaneous HS Macrina Hunt PA-C    insulin lispro 2-12 Units Subcutaneous TID AC Grecia Mujica MD    levETIRAcetam 500 mg Oral BID Magaly Cruz MD    lisinopril 10 mg Oral Daily Grecia Mujica MD    LORazepam 0 5 mg Oral Q8H PRN Magaly Cruz MD    mesalamine 500 mg Oral 4x Daily Grecia Mujica MD    risperiDONE 0 5 mg Oral Q8H PRN Deloris Kim PA-C    sertraline 50 mg Oral HS Grecia Mujica MD    sodium chloride 50 mL/hr Intravenous Continuous Magaly Cruz MD Last Rate: 50 mL/hr (03/12/18 1022)     Continuous Infusions:   sodium chloride 50 mL/hr Last Rate: 50 mL/hr (03/12/18 1022)     PRN Meds:   acetaminophen    LORazepam    risperiDONE      Fingerstick ac and hs   Cons carb diet   Daily weight   I&O   OT PT eval   Speech eval   Endocrinology consult   Urology consult   Cardiology and neuro consult   SCD  3/12 bmp, hgb a1c, cbc , bmp   Na   135, cl  99, an gap 15, gluc  303  Serial trop     IM note 3/11  Assessment:   Principal Problem:    Acute cystitis with hematuria  Active Problems:    Diabetes mellitus ([facility] )    Paroxysmal atrial fibrillation (HCC)    HTN (hypertension)    Hyperlipidemia    Weakness    Coronary artery disease involving native heart with angina pectoris ([facility] )    History of CVA in adulthood    Aphasia as late effect of cerebrovascular accident    Seizure disorder ([facility] )   Plan:   · AMS: possibly due to toxic metabolic encephalopathy in the setting of UTI   · Questionable syncope: patient was found down on the floor   ? Possibly 2/2 underlying Afib vs  CAD vs  CVA vs  UTI vs  Seizure disorder   ? TNI negative x 3  ? For 2D ECHO  ? Abx as below  ? May need stress vs and/or EEG   ? Neurology and cardiology consults   · Pyuria: likely 2/2 UTI - acute cystitis w/hematuria   ? F/u UCx   ? F/u BCUL x 2   ? Afebrile, no leukocytosis   ? C/w Ceftriaxone    ?  Decrease IVF NS to 50 mL/hr   · Weakness: likely 2/2 above     Plan for Additional Problems:   · HTN: c/w Lisinopril   · HLP: c/w Lipitor and Gemfibrozil   · CAD s/p CABG: c/w Lisinopril and Gemfibrozil   ? Likely not on ASA due to the fact patient is on Eliquis  · Afib: Currently, NSR        Not on a rate controlling agent  ? c/w Eliquis   · T2DM w/hyperglycemia: on Sitagliptin, Metformin and Pioglitazone outpatient - will hold  ? c/w FAITH AC/HS while inpatient - increase to algorithm 4   · Seizure disorder: f/u Keppra level   ? c/w Keppra  · Hx of CVA: c/w Eliquis and Lipitor  · UC: c/w Imuran and Apriso    VTE Pharmacologic Prophylaxis:   Pharmacologic: Apixaban (Eliquis)  Mechanical VTE Prophylaxis in Place: Yes   Patient Centered Rounds: I have performed bedside rounds with nursing staff today    Discussions with Specialists or Other Care Team Provider: Monty Hanna  Education and Discussions with Family / Patient: Will call patient's wife    Time Spent for Care: 30 minutes  More than 50% of total time spent on counseling and coordination of care as described above    Current Length of Stay: 1 day(s)   Current Patient Status: Inpatient   Certification Statement: The patient will continue to require additional inpatient hospital stay due to IV antibiotics, f/u UCx  [address] / Estimated Discharge Date: Likely STR once medically stable      Cardiology consult  3/11  Assessment:  1  UTI  2  Cystitis with hematuria  3  Hx CAD with CABG 08/2015 at [facility]  4  Paroxysmal Afib - NSR   5  Advanced Dementia  6  Fall vs Syncope  7  Hypertension    Plan:  1  Check echocardiogram   2  Add Amlodipine 5mg daily for elevated BP    Neuro consult  3/11  Assessment:  1  Change in mental status - suspect secondary to current UTI in the face of multi-infarct dementia  2  Multiple CVAs including left middle cerebral artery distribution, right anterior middle cerebral artery distribution and left cerebellar infarctions secondary to atrial fibrillation  3  Single seizure associated with CVA 4 years ago presently on Keppra  4   Dementia - multi-infarct versus Alzheimer's dementia   Plan:  Continue current anticoagulation as well of supportive therapy with antibiotics regarding his urinary tract infection adjusting appropriately as per sensitivities  Continue Keppra, Keppra level pending  Continue Aricept    IM note  3/12  Assessment:   Principal Problem:    Acute cystitis with hematuria  Active Problems:    Diabetes mellitus ([facility] )    Paroxysmal atrial fibrillation (HCC)    HTN (hypertension)    Hyperlipidemia    Weakness    Coronary artery disease involving native heart with angina pectoris ([facility] )    History of CVA in adulthood    Aphasia as late effect of cerebrovascular accident    Seizure disorder (HCC)   Plan:   AMS: possibly due to toxic metabolic encephalopathy in the setting of UTI   · Questionable syncope: patient was found down on the floor   ? Possibly 2/2 underlying Afib vs  CAD vs  CVA vs  UTI vs  Seizure disorder   ? TNI negative x 3  ? F/u 2D ECHO  ? Abx as below  ? Appreciate neurology and cardiology recs   · Pyuria: likely 2/2 UTI - acute cystitis w/hematuria   ? F/u UCx   ? F/u BCUL x 2   ? Afebrile, no leukocytosis   ? C/w Ceftriaxone    ? c/w IVF NS to 50 mL/hr   ? Due to the fact that this is a recurrent UTI, will consult urology   § Also, initial difficulty in placement of glez catheter r/o stricture   · Weakness: likely 2/2 above              PT/OT              STR   Plan for Additional Problems:   · HTN: Better controlled   ? c/w Lisinopril and Amlodipine  · HLP: c/w Lipitor and Gemfibrozil   · CAD s/p CABG: c/w Lisinopril and Gemfibrozil   ? Likely not on ASA due to the fact patient is on Eliquis  · Afib: Currently, NSR        Not on a rate controlling agent  ? c/w Eliquis   · T2DM w/hyperglycemia not on long term Insuln:   ? on Sitagliptin, Metformin and Pioglitazone outpatient - will hold  ? Previous A1c 12 1  ? Repeat A1c   ? Lantus 10 un SQ x 1 now   ? Start Lantus 20 un SQ QHS   ? c/w FAITH AC/HS algorithm 4  ?  FSBS AC/HS/2 AM   ? Endocrinology consult    · Seizure disorder: f/u Keppra level   ? c/w Keppra  · Hx of CVA: c/w Eliquis and Lipitor  · UC: c/w Imuran and Apriso    VTE Pharmacologic Prophylaxis:   Pharmacologic: Apixaban (Eliquis)  Mechanical VTE Prophylaxis in Place: Yes   Patient Centered Rounds: I have performed bedside rounds with nursing staff today    Discussions with Specialists or Other Care Team Provider: Jaquelin Murcia  Education and Discussions with Family / Patient: Patient's wife - would like STR    Time Spent for Care: 30 minutes    More than 50% of total time spent on counseling and coordination of care as described above    Current Length of Stay: 2 day(s)   Current Patient Status: Inpatient   Certification Statement: The patient will continue to require additional inpatient hospital stay due to IV antibiotics, f/u UCx   Discharge Plan / Estimated Discharge Date: Likely STR once medically stable

## 2018-03-12 NOTE — PHYSICIAN ADVISOR
Current patient class: Inpatient  The patient is currently on Hospital Day: 3      The patient was admitted to the hospital at 1255 on 3/10/18 for the following diagnosis:  UTI (urinary tract infection) [N39 0]  Altered mental status [R41 82]  Weakness [R53 1]  Dementia [F03 90]  Acute cystitis with hematuria [N30 01]  Atrial fibrillation, unspecified type ([address] ) [I48 91]       There is documentation in the medical record of an expected length of stay of at least 2 midnights  The patient is therefore expected to satisfy the 2 midnight benchmark and given the 2 midnight presumption is appropriate for INPATIENT ADMISSION  Given this expectation of a satisfying stay, CMS instructs us that the patient is most often appropriate for inpatient admission under part A provided medical necessity is documented in the chart  After review of the relevant documentation, labs, vital signs and test results, the patient is appropriate for INPATIENT ADMISSION  Admission to the hospital as an inpatient is a complex decision making process which requires the practitioner to consider the patients presenting complaint, history and physical examination and all relevant testing  With this in mind, in this case, the patient was deemed appropriate for INPATIENT ADMISSION  After review of the documentation and testing available at the time of the admission I concur with this clinical determination of medical necessity  Rationale is as follows: The patient is a 65 yrs old Male who presented to the ED at 3/10/2018 10:02 AM with a chief complaint of Altered Mental Status (patient with history of dementia, was found on floor with blanket and pillow, foul smelling urine   )     Patient admitted with a report from a family member of the patient getting weaker and being found on the floor  There was no reported witnessed fall  The patient has a medical history of CVA with secondary aphasia, seizure disorder and CAD    The concern for the present admission was altered mental status due to probable toxic metabolic encephalopathy secondary to UTI  There was also a concern for syncope  He was admitted to a telemetry bed and consults were ordered with Cardiology, Neurology, Urology and Endocrinology  Cardiology recommended an echocardiagram   Blood cultures are pending and so is a urine culture  The UA revealed positive RBCs, WBCs, and bacteria  The patient is on IV antibiotics  It is unclear as to why the patient continues to have recurrent UTIs and whether he had a true syncopal episode  A two night admission status to the hospital would be considered appropriate for this patient  The patients vitals on arrival were ED Triage Vitals [03/10/18 1007]   Temperature Pulse Respirations Blood Pressure SpO2   97 8 °F (36 6 °C) 71 16 159/72 96 %      Temp Source Heart Rate Source Patient Position - Orthostatic VS BP Location FiO2 (%)   Rectal Monitor Lying Right arm --      Pain Score       No Pain           Past Medical History:   Diagnosis Date    Anxiety     Cardiac disease     Dementia     Diabetes mellitus ([facility] )     Hyperlipidemia     Hypertension     Polio     Psychiatric disorder     Seizures (HCC)     1 seizure     Stroke [facility])      Past Surgical History:   Procedure Laterality Date    APPENDECTOMY      CARDIAC SURGERY      NM ESOPHAGOGASTRODUODENOSCOPY TRANSORAL DIAGNOSTIC N/A 3/21/2017    Procedure: ESOPHAGOGASTRODUODENOSCOPY (EGD); Surgeon: Isatu Chambers MD;  Location: [facility];   Service: Gastroenterology    ROTATOR CUFF REPAIR Left            Consults have been placed to:   IP CONSULT TO NEUROLOGY  IP CONSULT TO CARDIOLOGY  IP CONSULT TO UROLOGY  IP CONSULT TO ENDOCRINOLOGY    Vitals:    03/11/18 1500 03/11/18 2355 03/12/18 0600 03/12/18 0744   BP: 157/70 141/60  141/70   BP Location: Right arm Right arm  Right arm   Pulse: 79 80  72   Resp: 18 18  16   Temp: 98 3 °F (36 8 °C) 97 5 °F (36 4 °C)  97 5 °F (36 4 °C)   TempSrc: Oral Axillary  Axillary   SpO2: 92% 90%  94%   Weight:   96 6 kg (212 lb 15 4 oz)    Height:           Most recent labs:    Recent Labs      03/10/18   1506   03/11/18   0030  03/11/18   0526  03/12/18   0520   WBC   --    --    --   6 90  8 60   HGB   --    --    --   13 9  14 5   HCT   --    --    --   42 1  44 3   PLT   --    --    --   164  176   K  3 8   --    --   3 9  3 7   NA  136   --    --   137  135*   CALCIUM  9 2   --    --   9 0  8 6   BUN  12   --    --   13  11   CREATININE  0 87   --    --   0 71  0 71   TROPONINI   --    < >  <0 02   --    --    AST  13   --    --   20   --    ALT  17   --    --   16   --    ALKPHOS  72   --    --   68   --    BILITOT  0 70   --    --   0 80   --     < > = values in this interval not displayed         Scheduled Meds:  Current Facility-Administered Medications:  acetaminophen 650 mg Oral Q6H PRN Yohannes Alex MD    amLODIPine 5 mg Oral Daily Kayli ePrdue MD    apixaban 2 5 mg Oral BID Yohannes Alex MD    atorvastatin 20 mg Oral Daily Yohannes Alex MD    azaTHIOprine 50 mg Oral Daily Grecia Drew MD    cefTRIAXone 2,000 mg Intravenous Q24H Walter Dangelo DO Last Rate: 2,000 mg (03/11/18 1317)   donepezil 10 mg Oral HS Grecia Drew MD    gemfibrozil 600 mg Oral BID CHRIS Drew MD    hydrocortisone 25 mg Rectal BID Yohannes Alex MD    insulin glargine 20 Units Subcutaneous HS Yohannes Alex MD    insulin lispro 1-6 Units Subcutaneous HS Macrina Hunt PA-C    insulin lispro 2-12 Units Subcutaneous TID AC Yohannes Alex MD    levETIRAcetam 500 mg Oral BID Yohannes Alex MD    lisinopril 10 mg Oral Daily Grecia Drew MD    LORazepam 0 5 mg Oral Q8H PRN Yohannes Alex MD    mesalamine 500 mg Oral 4x Daily Grecia Drew MD    risperiDONE 0 5 mg Oral Q8H PRN Megha Chanel PA-C    sertraline 50 mg Oral HS Grecia Drew MD    sodium chloride 50 mL/hr Intravenous Continuous Yohannes Alex MD Last Rate: 50 mL/hr (03/12/18 1022)     Continuous Infusions:  sodium chloride 50 mL/hr Last Rate: 50 mL/hr (03/12/18 1022)     PRN Meds:   acetaminophen    LORazepam    risperiDONE    Surgical procedures (if appropriate):

## 2018-03-12 NOTE — PHYSICAL THERAPY NOTE
PT Evaluation (15min)  (14:00-14:15)    Past Medical History:   Diagnosis Date    Anxiety     Cardiac disease     Dementia     Diabetes mellitus ([facility] [address] [facility] )     Hyperlipidemia     Hypertension     Polio     Psychiatric disorder     Seizures ([facility] )     1 seizure     Stroke ([facility] )         03/12/18 1415   Note Type   Note type Eval only   Pain Assessment   Pain Assessment No/denies pain   Home Living   Type of [address] Two level  (full flight of stairs to 2nd floor)   [address] [place] [address] chair   Home Equipment Walker;Cane   Additional Comments pt extremely poor historian; accuracy of social hx questionable  Prior Function   Level of [place] Needs assistance with ADLs and functional mobility  (per H&P; ambulates c RW c (A))   Lives With Spouse  (having difficulty caring for pt)   Receives Help From Family   ADL Assistance Needs assistance   IADLs Needs assistance   Falls in the last 6 months 1 to 4   Restrictions/Precautions   Other Precautions Cognitive; Chair Alarm; Bed Alarm;Multiple lines; Fall Risk  (aphasia)   General   Additional Pertinent History pt presents to [facility] p being found down on floor by spouse  pt also c increased confusion currently undergoing workup for UTI  dx: acute cystitis c hematuria  PT consulted for mobility + d/c planning  Family/Caregiver Present No   Cognition   Overall Cognitive Status Impaired   Orientation Level Oriented to person   Following Commands Follows one step commands with increased time or repetition   RUE Assessment   RUE Assessment WFL   LUE Assessment   LUE Assessment WFL   RLE Assessment   RLE Assessment WFL  (4-/5)   LLE Assessment   LLE Assessment WFL  (4-/5)   Coordination   Sensation WFL   Bed Mobility   Sit to Supine 4  Minimal assistance   Additional items Assist x 2;Verbal cues; Increased time required;LE management   Transfers   Sit to Stand 4  Minimal assistance   Additional items Verbal cues; Increased time required;Assist x 2 Stand to Sit 4  Minimal assistance   Additional items Verbal cues; Increased time required;Assist x 2   Additional Comments pt incontinent of stool during session   Ambulation/Elevation   Gait pattern Narrow JUSTICE; Decreased foot clearance; Forward Flexion  (unteady)   Gait Assistance 4  Minimal assist   Additional items Assist x 2;Verbal cues   Assistive Device Rolling walker   Distance 3'; BSC>bed; limited 2* impaired cognition   Balance   Static Sitting Fair +   Dynamic Sitting Fair   Static Standing Poor +   Dynamic Standing Poor   Ambulatory Poor   Activity Tolerance   Activity Tolerance Patient limited by fatigue   Nurse Made Aware Christina   Assessment   Prognosis Fair   Problem List Decreased strength;Decreased endurance; Impaired balance;Decreased mobility; Decreased cognition;Decreased safety awareness; Impaired judgement   Assessment pt is a 78y/o m who presents to [facility] p being found down on floor by spouse  pt currently undergoing workup for UTI  PMH significant for dementia c behavioral disturbance, aphasia, CVA, seizure disorider, DM 2, CAD, + a-fib  pt extremely poor historian c most hx obtained from H&P  at baseline, pt requires (A) from spouse c ADLs + functional mobility c RW  reports living in a 2 story home  spouse admits to difficulty cont to care for pt at home  upon arrival for eval, pt sitting on BSC stating, "I need the bathroom"  upon standing from [facility] pt became incontinent of stool requiring (A) c pericare  required min (A)x2 for functional mobility tasks 2* significant deficits in strength, balance, gait quality, cognition + activity tolerance noted in PT exam above  Barthel Index 20/100  mod verbal cues required for safety c transitions  ambulated 3' to bed c RW limited by fatigue + cognition  nsg present at end of session  would benefit from skilled PT to maximize functional mobility + improve quality of life  upon d/c, recommend STR   PT eval of high complexity 2* unstable med status c pt requiring ongoing medical management 2* acute cystitis c hematuria  pt c multiple co-morbidities including dementia c behavioral disturbance, old CVA c residual aphasia, + seizure disorder  pt requires min (A)x2 for mobility tasks 2* significant mobility deficits above  pt also c impaired safety awareness  currently c multiple lines  resides c spouse who is unable to cont to care for pt  Barriers to Discharge Decreased caregiver support; Inaccessible home environment   Goals   Patient Goals none expressed   STG Expiration Date 03/22/18   Short Term Goal #1 1  increase strength 1/2 grade to improve overall functional mobility, 2  perform bed mobility c (S) to sit up + eat a meal, 3  perform transfers c (S) to safely perform ADLs, 4  ambulate 150' c RW c (S) to safely navigate home environment, 4  negotiate 12 stairs c (s) to safely access 2nd floor of home   Plan   Treatment/Interventions Functional transfer training;LE strengthening/ROM; Elevations; Therapeutic exercise; Endurance training;Patient/family training;Equipment eval/education; Bed mobility;Gait training;Spoke to nursing   PT Frequency 5x/wk   Recommendation   Recommendation Short-term skilled PT   PT - OK to Discharge Yes  (to STR )   Barthel Index   Feeding 5   Bathing 0   Grooming Score 0   Dressing Score 5   Bladder Score 0   Bowels Score 0   Toilet Use Score 5   Transfers (Bed/Chair) Score 5   Mobility (Level Surface) Score 0   Stairs Score 0   Barthel Index Score 20     Dipti Layne, PT

## 2018-03-13 ENCOUNTER — APPOINTMENT (INPATIENT)
Dept: ULTRASOUND IMAGING | Facility: HOSPITAL | Age: 78
DRG: 637 | End: 2018-03-13
Payer: MEDICARE

## 2018-03-13 LAB
ANION GAP SERPL CALCULATED.3IONS-SCNC: 11 MMOL/L (ref 4–13)
BUN SERPL-MCNC: 15 MG/DL (ref 5–25)
CALCIUM SERPL-MCNC: 9.2 MG/DL (ref 8.3–10.1)
CHLORIDE SERPL-SCNC: 103 MMOL/L (ref 100–108)
CO2 SERPL-SCNC: 23 MMOL/L (ref 21–32)
CREAT SERPL-MCNC: 0.87 MG/DL (ref 0.6–1.3)
GFR SERPL CREATININE-BSD FRML MDRD: 83 ML/MIN/1.73SQ M
GLUCOSE SERPL-MCNC: 122 MG/DL (ref 65–140)
GLUCOSE SERPL-MCNC: 124 MG/DL (ref 65–140)
GLUCOSE SERPL-MCNC: 134 MG/DL (ref 65–140)
GLUCOSE SERPL-MCNC: 143 MG/DL (ref 65–140)
GLUCOSE SERPL-MCNC: 163 MG/DL (ref 65–140)
GLUCOSE SERPL-MCNC: 86 MG/DL (ref 65–140)
POTASSIUM SERPL-SCNC: 3.6 MMOL/L (ref 3.5–5.3)
SODIUM SERPL-SCNC: 137 MMOL/L (ref 136–145)

## 2018-03-13 PROCEDURE — 82948 REAGENT STRIP/BLOOD GLUCOSE: CPT

## 2018-03-13 PROCEDURE — 80048 BASIC METABOLIC PNL TOTAL CA: CPT | Performed by: INTERNAL MEDICINE

## 2018-03-13 PROCEDURE — 76770 US EXAM ABDO BACK WALL COMP: CPT

## 2018-03-13 PROCEDURE — 99222 1ST HOSP IP/OBS MODERATE 55: CPT | Performed by: NURSE PRACTITIONER

## 2018-03-13 PROCEDURE — 99232 SBSQ HOSP IP/OBS MODERATE 35: CPT | Performed by: INTERNAL MEDICINE

## 2018-03-13 RX ORDER — INSULIN GLARGINE 100 [IU]/ML
18 INJECTION, SOLUTION SUBCUTANEOUS
Status: DISCONTINUED | OUTPATIENT
Start: 2018-03-13 | End: 2018-03-16 | Stop reason: HOSPADM

## 2018-03-13 RX ADMIN — MESALAMINE 500 MG: 250 CAPSULE ORAL at 00:26

## 2018-03-13 RX ADMIN — GEMFIBROZIL 600 MG: 600 TABLET, FILM COATED ORAL at 07:00

## 2018-03-13 RX ADMIN — AMLODIPINE BESYLATE 5 MG: 5 TABLET ORAL at 08:39

## 2018-03-13 RX ADMIN — INSULIN GLARGINE 18 UNITS: 100 INJECTION, SOLUTION SUBCUTANEOUS at 21:06

## 2018-03-13 RX ADMIN — MESALAMINE 500 MG: 250 CAPSULE ORAL at 21:06

## 2018-03-13 RX ADMIN — SODIUM CHLORIDE 50 ML/HR: 0.9 INJECTION, SOLUTION INTRAVENOUS at 00:18

## 2018-03-13 RX ADMIN — SERTRALINE HYDROCHLORIDE 50 MG: 50 TABLET ORAL at 00:26

## 2018-03-13 RX ADMIN — SODIUM CHLORIDE 50 ML/HR: 0.9 INJECTION, SOLUTION INTRAVENOUS at 21:06

## 2018-03-13 RX ADMIN — LEVETIRACETAM 500 MG: 500 TABLET ORAL at 08:38

## 2018-03-13 RX ADMIN — MESALAMINE 500 MG: 250 CAPSULE ORAL at 18:44

## 2018-03-13 RX ADMIN — HYDROCORTISONE ACETATE 25 MG: 25 SUPPOSITORY RECTAL at 18:46

## 2018-03-13 RX ADMIN — INSULIN LISPRO 1 UNITS: 100 INJECTION, SOLUTION INTRAVENOUS; SUBCUTANEOUS at 21:08

## 2018-03-13 RX ADMIN — HYDROCORTISONE ACETATE 25 MG: 25 SUPPOSITORY RECTAL at 08:40

## 2018-03-13 RX ADMIN — SERTRALINE HYDROCHLORIDE 50 MG: 50 TABLET ORAL at 21:06

## 2018-03-13 RX ADMIN — MESALAMINE 500 MG: 250 CAPSULE ORAL at 08:38

## 2018-03-13 RX ADMIN — CEFTRIAXONE 2000 MG: 2 INJECTION, SOLUTION INTRAVENOUS at 12:29

## 2018-03-13 RX ADMIN — MESALAMINE 500 MG: 250 CAPSULE ORAL at 12:30

## 2018-03-13 RX ADMIN — ATORVASTATIN CALCIUM 20 MG: 20 TABLET, FILM COATED ORAL at 08:39

## 2018-03-13 RX ADMIN — LEVETIRACETAM 500 MG: 500 TABLET ORAL at 18:44

## 2018-03-13 RX ADMIN — AZATHIOPRINE 50 MG: 50 TABLET ORAL at 08:39

## 2018-03-13 RX ADMIN — DONEPEZIL HYDROCHLORIDE 10 MG: 5 TABLET ORAL at 00:27

## 2018-03-13 RX ADMIN — INSULIN GLARGINE 25 UNITS: 100 INJECTION, SOLUTION SUBCUTANEOUS at 00:26

## 2018-03-13 RX ADMIN — LISINOPRIL 10 MG: 10 TABLET ORAL at 08:38

## 2018-03-13 RX ADMIN — DONEPEZIL HYDROCHLORIDE 10 MG: 5 TABLET ORAL at 21:06

## 2018-03-13 RX ADMIN — APIXABAN 2.5 MG: 2.5 TABLET, FILM COATED ORAL at 18:44

## 2018-03-13 RX ADMIN — APIXABAN 2.5 MG: 2.5 TABLET, FILM COATED ORAL at 08:39

## 2018-03-13 RX ADMIN — GEMFIBROZIL 600 MG: 600 TABLET, FILM COATED ORAL at 16:30

## 2018-03-13 NOTE — PROGRESS NOTES
[facility] Internal Medicine Progress Note  Patient: Giselle Hogan 65 y o  male   MRN: [id number]  PCP: Siri Hernandez DO  Unit/Bed#: [facility] [place] Encounter: [id number]  Date Of Visit: 03/13/18    Assessment:    Principal Problem:    Acute cystitis with hematuria  Active Problems:    Type 2 diabetes mellitus with hyperglycemia, without long-term current use of insulin (HCC)    Paroxysmal atrial fibrillation (HCC)    HTN (hypertension)    Hyperlipidemia    Weakness    Coronary artery disease involving native heart with angina pectoris ([facility] )    History of CVA in adulthood    Aphasia as late effect of cerebrovascular accident    Seizure disorder ([facility] [facility] )      Plan:    # Acute encephalopathy possibly d/t metabolic encephalopathy given hyperglycemia, s/p urine cx doubt d/t TME as non significant colony ct  - underlying hx of underlying dementia  - s/p CT head no acute pathology  - Neurology on board    # ? Syncope - not sure how reliable history is  - troponin neg x 3  - s/p echo  - hx of afib    # Pyuria - s/p urine cx; < 1K count thus d/c IV abx  - seem per Urology due to recurrent UTI, recommend to leave Swanson in place, voiding trial as outpatient    # DM II, hyperglycemia, elevated HgA1c  - s/p endocrinology consult  - initiated on insulin regimen  - better controlled    # paroxysmal atrial fibrillation - rate controlled, on eliqus    VTE Pharmacologic Prophylaxis:   Pharmacologic: Apixaban (Eliquis)  Mechanical VTE Prophylaxis in Place: Yes    Patient Centered Rounds: I have performed bedside rounds with nursing staff today  Discussions with Specialists or Other Care Team Provider:     Education and Discussions with Family / Patient:     Time Spent for Care: 30 minutes  More than 50% of total time spent on counseling and coordination of care as described above      Current Length of Stay: 3 day(s)    Current Patient Status: Inpatient   Certification Statement: The patient will continue to require additional inpatient hospital stay due to Awaiting placement    Discharge Plan / Estimated Discharge Date:     Code Status: Level 3 - DNAR and DNI      Subjective:   No complaints  No acute events overnight  Is asking to go home, however awaiting placement status post PT eval    Objective:     Vitals:   Temp (24hrs), Av 7 °F (36 5 °C), Min:97 3 °F (36 3 °C), Max:98 °F (36 7 °C)    HR:  [57-68] 66  Resp:  [16-17] 17  BP: (124-180)/(59-75) 141/64  SpO2:  [90 %-94 %] 94 %  Body mass index is 33 49 kg/m²  Input and Output Summary (last 24 hours): Intake/Output Summary (Last 24 hours) at 18 1911  Last data filed at 18 1639   Gross per 24 hour   Intake             1120 ml   Output              850 ml   Net              270 ml       Physical Exam:     Physical Exam   Constitutional: He appears well-developed  Neck: Neck supple  Cardiovascular: Normal rate, regular rhythm, normal heart sounds and intact distal pulses  Exam reveals no gallop and no friction rub  No murmur heard  Pulmonary/Chest: Effort normal and breath sounds normal  No respiratory distress  He has no wheezes  He has no rales  He exhibits no tenderness  Abdominal: Soft  Bowel sounds are normal  He exhibits no distension and no mass  There is no tenderness  There is no rebound and no guarding  Musculoskeletal: Normal range of motion  He exhibits no edema, tenderness or deformity  Neurological: He is alert  Oriented only to person  A phasic   Skin: Skin is warm and dry     Psychiatric:   Advanced dementia     Additional Data:     Labs:      Results from last 7 days  Lab Units 18  0520  03/10/18  1209   WBC Thousand/uL 8 60  < > 6 21   HEMOGLOBIN g/dL 14 5  < > 14 4   HEMATOCRIT % 44 3  < > 43 9   PLATELETS Thousands/uL 176  < > 153   NEUTROS PCT %  --   --  84*   LYMPHS PCT %  --   --  8*   LYMPHO PCT % 1*  --   --    MONOS PCT %  --   --  7   MONO PCT MAN % 3*  --   --    EOS PCT %  --   --  0   EOSINO PCT MANUAL % 0 --   --    < > = values in this interval not displayed  Results from last 7 days  Lab Units 03/13/18  0533  03/11/18  0526   SODIUM mmol/L 137  < > 137   POTASSIUM mmol/L 3 6  < > 3 9   CHLORIDE mmol/L 103  < > 99*   CO2 mmol/L 23  < > 22   BUN mg/dL 15  < > 13   CREATININE mg/dL 0 87  < > 0 71   CALCIUM mg/dL 9 2  < > 9 0   TOTAL PROTEIN g/dL  --   --  7 1   BILIRUBIN TOTAL mg/dL  --   --  0 80   ALK PHOS U/L  --   --  68   ALT U/L  --   --  16   AST U/L  --   --  20   GLUCOSE RANDOM mg/dL 122  < > 271*   < > = values in this interval not displayed  * I Have Reviewed All Lab Data Listed Above  * Additional Pertinent Lab Tests Reviewed: All Labs Within Last 24 Hours Reviewed    Imaging:    Imaging Reports Reviewed Today Include:   Imaging Personally Reviewed by Myself Includes:      Recent Cultures (last 7 days):       Results from last 7 days  Lab Units 03/11/18  1140 03/10/18  1216 03/10/18  1209   BLOOD CULTURE   --  No Growth at 48 hrs  No Growth at 48 hrs     URINE CULTURE  No Growth <1000 cfu/mL  --   --        Last 24 Hours Medication List:     Current Facility-Administered Medications:  acetaminophen 650 mg Oral Q6H PRN Agnes Marino MD    amLODIPine 5 mg Oral Daily Genaro Cox MD    apixaban 2 5 mg Oral BID Agnes Marino MD    atorvastatin 20 mg Oral Daily Grecia Handy MD    azaTHIOprine 50 mg Oral Daily Grecia Handy MD    donepezil 10 mg Oral HS Grecia Handy MD    gemfibrozil 600 mg Oral BID CHRIS Handy MD    hydrocortisone 25 mg Rectal BID Agnes Marino MD    insulin glargine 18 Units Subcutaneous HS Jake Roblero MD    insulin lispro 1-6 Units Subcutaneous HS Macrina Hunt PA-C    insulin lispro 2-12 Units Subcutaneous TID CHRIS Handy MD    insulin lispro 6 Units Subcutaneous TID With Meals Jake Roblero MD    levETIRAcetam 500 mg Oral BID Agnes Marino MD    lisinopril 10 mg Oral Daily Agnes Marino MD    LORazepam 0 5 mg Oral Q8H PRN Agnes Marino MD    mesalamine 500 mg Oral 4x Daily Grecia Boyer MD    risperiDONE 0 5 mg Oral Q8H PRN Ema Proctor PA-C    sertraline 50 mg Oral HS Grecia Boyer MD    sodium chloride 50 mL/hr Intravenous Continuous Sarah Sofia MD Last Rate: 50 mL/hr (03/13/18 0018)        Today, Patient Was Seen By: Eugene Rosas DO    ** Please Note: This note has been constructed using a voice recognition system   **

## 2018-03-13 NOTE — PLAN OF CARE
Potential for Falls     Patient will remain free of falls Not Progressing        SAFETY ADULT     Maintain or return to baseline ADL function Not Progressing          INFECTION - ADULT     Absence or prevention of progression during hospitalization Progressing

## 2018-03-14 LAB
GLUCOSE SERPL-MCNC: 139 MG/DL (ref 65–140)
GLUCOSE SERPL-MCNC: 212 MG/DL (ref 65–140)
GLUCOSE SERPL-MCNC: 223 MG/DL (ref 65–140)
LEVETIRACETAM SERPL-MCNC: 11.8 UG/ML (ref 10–40)

## 2018-03-14 PROCEDURE — 87493 C DIFF AMPLIFIED PROBE: CPT | Performed by: INTERNAL MEDICINE

## 2018-03-14 PROCEDURE — 82948 REAGENT STRIP/BLOOD GLUCOSE: CPT

## 2018-03-14 PROCEDURE — 99232 SBSQ HOSP IP/OBS MODERATE 35: CPT | Performed by: INTERNAL MEDICINE

## 2018-03-14 RX ADMIN — SODIUM CHLORIDE 50 ML/HR: 0.9 INJECTION, SOLUTION INTRAVENOUS at 17:20

## 2018-03-14 RX ADMIN — LEVETIRACETAM 500 MG: 500 TABLET ORAL at 10:56

## 2018-03-14 RX ADMIN — MESALAMINE 500 MG: 250 CAPSULE ORAL at 23:57

## 2018-03-14 RX ADMIN — APIXABAN 2.5 MG: 2.5 TABLET, FILM COATED ORAL at 17:06

## 2018-03-14 RX ADMIN — MESALAMINE 500 MG: 250 CAPSULE ORAL at 10:56

## 2018-03-14 RX ADMIN — LEVETIRACETAM 500 MG: 500 TABLET ORAL at 17:06

## 2018-03-14 RX ADMIN — MESALAMINE 500 MG: 250 CAPSULE ORAL at 17:06

## 2018-03-14 RX ADMIN — LORAZEPAM 0.5 MG: 0.5 TABLET ORAL at 17:54

## 2018-03-14 RX ADMIN — INSULIN LISPRO 4 UNITS: 100 INJECTION, SOLUTION INTRAVENOUS; SUBCUTANEOUS at 17:08

## 2018-03-14 RX ADMIN — HYDROCORTISONE ACETATE 25 MG: 25 SUPPOSITORY RECTAL at 17:09

## 2018-03-14 RX ADMIN — DONEPEZIL HYDROCHLORIDE 10 MG: 5 TABLET ORAL at 23:57

## 2018-03-14 RX ADMIN — GEMFIBROZIL 600 MG: 600 TABLET, FILM COATED ORAL at 17:07

## 2018-03-14 RX ADMIN — AZATHIOPRINE 50 MG: 50 TABLET ORAL at 10:56

## 2018-03-14 RX ADMIN — GEMFIBROZIL 600 MG: 600 TABLET, FILM COATED ORAL at 06:29

## 2018-03-14 RX ADMIN — ATORVASTATIN CALCIUM 20 MG: 20 TABLET, FILM COATED ORAL at 10:56

## 2018-03-14 RX ADMIN — APIXABAN 2.5 MG: 2.5 TABLET, FILM COATED ORAL at 10:57

## 2018-03-14 RX ADMIN — RISPERIDONE 0.5 MG: 0.25 TABLET ORAL at 23:57

## 2018-03-14 RX ADMIN — SERTRALINE HYDROCHLORIDE 50 MG: 50 TABLET ORAL at 23:57

## 2018-03-14 RX ADMIN — INSULIN LISPRO 4 UNITS: 100 INJECTION, SOLUTION INTRAVENOUS; SUBCUTANEOUS at 12:00

## 2018-03-14 RX ADMIN — AMLODIPINE BESYLATE 5 MG: 5 TABLET ORAL at 10:56

## 2018-03-14 RX ADMIN — LISINOPRIL 10 MG: 10 TABLET ORAL at 10:56

## 2018-03-14 NOTE — PROGRESS NOTES
[facility] Internal Medicine Progress Note  Patient: Melisa Ariza 65 y o  male   MRN: [id number]  PCP: Andrea Walker DO  Unit/Bed#: -01 Encounter: [id number]  Date Of Visit: 03/14/18    Assessment:    Principal Problem:    Acute cystitis with hematuria  Active Problems:    Type 2 diabetes mellitus with hyperglycemia, without long-term current use of insulin (HCC)    Paroxysmal atrial fibrillation (HCC)    HTN (hypertension)    Hyperlipidemia    Weakness    Coronary artery disease involving native heart with angina pectoris ([facility] [address] )    History of CVA in adulthood    Aphasia as late effect of cerebrovascular accident    Seizure disorder ([facility] [address] )      Plan:    # Acute encephalopathy possibly d/t metabolic encephalopathy given hyperglycemia, s/p urine cx doubt d/t TME as non significant colony ct  - underlying hx of underlying dementia  - s/p CT head no acute pathology  - Neurology on board    # ? Syncope - not sure how reliable history is  - troponin neg x 3  - s/p echo  - hx of afib    # Pyuria - s/p urine cx; < 1K count thus d/c IV abx  - seen per Urology due to recurrent UTI, recommend to leave Swanson in place, voiding trial as outpatient    # DM II, hyperglycemia, elevated HgA1c  - s/p endocrinology consult  - initiated on insulin regimen  - better controlled    # paroxysmal atrial fibrillation - rate controlled, on eliqus    # Diarrhea - r/o c-diff as was on abx prior    VTE Pharmacologic Prophylaxis:   Pharmacologic: Apixaban (Eliquis)  Mechanical VTE Prophylaxis in Place: Yes    Patient Centered Rounds: I have performed bedside rounds with nursing staff today  Discussions with Specialists or Other Care Team Provider:     Education and Discussions with Family / Patient:     Time Spent for Care: 30 minutes  More than 50% of total time spent on counseling and coordination of care as described above  Current Length of Stay: 4 day(s)    Current Patient Status: Inpatient   Certification Statement:  The patient will continue to require additional inpatient hospital stay due to Awaiting placement    Discharge Plan / Estimated Discharge Date:     Code Status: Level 3 - DNAR and DNI      Subjective:   Per RN has had 3 episodes of non foul-smelling nonmucous diarrhea  Objective:     Vitals:   Temp (24hrs), Av 8 °F (36 6 °C), Min:97 8 °F (36 6 °C), Max:97 8 °F (36 6 °C)    HR:  [63-73] 73  Resp:  [16-20] 20  BP: (137-176)/(72-83) 163/72  SpO2:  [94 %-96 %] 94 %  Body mass index is 33 15 kg/m²  Input and Output Summary (last 24 hours): Intake/Output Summary (Last 24 hours) at 18 191  Last data filed at 18 1359   Gross per 24 hour   Intake              480 ml   Output             1400 ml   Net             -920 ml       Physical Exam:     Physical Exam   Constitutional: He appears well-developed  Neck: Neck supple  Cardiovascular: Normal rate, regular rhythm, normal heart sounds and intact distal pulses  Exam reveals no gallop and no friction rub  No murmur heard  Pulmonary/Chest: Effort normal and breath sounds normal  No respiratory distress  He has no wheezes  He has no rales  He exhibits no tenderness  Abdominal: Soft  Bowel sounds are normal  He exhibits no distension and no mass  There is no tenderness  There is no rebound and no guarding  Musculoskeletal: Normal range of motion  He exhibits no edema, tenderness or deformity  Neurological: He is alert  Oriented only to person  A phasic   Skin: Skin is warm and dry     Psychiatric:   Advanced dementia     Additional Data:     Labs:      Results from last 7 days  Lab Units 18  0520  03/10/18  1209   WBC Thousand/uL 8 60  < > 6 21   HEMOGLOBIN g/dL 14 5  < > 14 4   HEMATOCRIT % 44 3  < > 43 9   PLATELETS Thousands/uL 176  < > 153   NEUTROS PCT %  --   --  84*   LYMPHS PCT %  --   --  8*   LYMPHO PCT % 1*  --   --    MONOS PCT %  --   --  7   MONO PCT MAN % 3*  --   --    EOS PCT %  --   --  0   EOSINO PCT MANUAL % 0  -- --    < > = values in this interval not displayed  Results from last 7 days  Lab Units 03/13/18  0533  03/11/18  0526   SODIUM mmol/L 137  < > 137   POTASSIUM mmol/L 3 6  < > 3 9   CHLORIDE mmol/L 103  < > 99*   CO2 mmol/L 23  < > 22   BUN mg/dL 15  < > 13   CREATININE mg/dL 0 87  < > 0 71   CALCIUM mg/dL 9 2  < > 9 0   TOTAL PROTEIN g/dL  --   --  7 1   BILIRUBIN TOTAL mg/dL  --   --  0 80   ALK PHOS U/L  --   --  68   ALT U/L  --   --  16   AST U/L  --   --  20   GLUCOSE RANDOM mg/dL 122  < > 271*   < > = values in this interval not displayed  * I Have Reviewed All Lab Data Listed Above  * Additional Pertinent Lab Tests Reviewed: All Labs Within Last 24 Hours Reviewed    Imaging:    Imaging Reports Reviewed Today Include:   Imaging Personally Reviewed by Myself Includes:      Recent Cultures (last 7 days):       Results from last 7 days  Lab Units 03/11/18  1140 03/10/18  1216 03/10/18  1209   BLOOD CULTURE   --  No Growth at 72 hrs  No Growth at 72 hrs     URINE CULTURE  No Growth <1000 cfu/mL  --   --        Last 24 Hours Medication List:     Current Facility-Administered Medications:  acetaminophen 650 mg Oral Q6H PRN Rikki Drew, MD    amLODIPine 5 mg Oral Daily Kishan Lovett MD    apixaban 2 5 mg Oral BID Rikki Drew, MD    atorvastatin 20 mg Oral Daily Grecia Bush MD    azaTHIOprine 50 mg Oral Daily Grecia Bush MD    donepezil 10 mg Oral HS Grecia Bush MD    gemfibrozil 600 mg Oral BID AC Grecia Bush MD    hydrocortisone 25 mg Rectal BID Rikki Drew, MD    insulin glargine 18 Units Subcutaneous HS Annabella Parrish MD    insulin lispro 1-6 Units Subcutaneous HS Macrina Hunt PA-C    insulin lispro 2-12 Units Subcutaneous TID AC Grecia Bush MD    insulin lispro 6 Units Subcutaneous TID With Meals Annabella Parrish MD    levETIRAcetam 500 mg Oral BID Rikki Drew, MD    lisinopril 10 mg Oral Daily Rikki Drew, MD    LORazepam 0 5 mg Oral Q8H PRN Rikki Drew, MD    mesalamine 500 mg Oral 4x Daily Alona Gray MD    risperiDONE 0 5 mg Oral Q8H PRN Bhavin Helms PA-C    sertraline 50 mg Oral HS Grecia Rustam Benson MD    sodium chloride 50 mL/hr Intravenous Continuous Alona Gray MD Last Rate: 50 mL/hr (03/14/18 1720)        Today, Patient Was Seen By: Eda Castillo DO    ** Please Note: This note has been constructed using a voice recognition system   **

## 2018-03-14 NOTE — CASE MANAGEMENT
Continued Stay Review    Date: 3/14/18    Vital Signs: BP (!) 176/83 (BP Location: Right arm)   Pulse 63   Temp 97 8 °F (36 6 °C) (Oral)   Resp 16   Ht 5' 7" (1 702 m)   Wt 96 kg (211 lb 10 3 oz)   SpO2 96%   BMI 33 15 kg/m²     Medications:   Scheduled Meds:   Current Facility-Administered Medications:  acetaminophen 650 mg Oral Q6H PRN Magaly Cruz MD    amLODIPine 5 mg Oral Daily Nerissa Cook MD    apixaban 2 5 mg Oral BID Magaly Cruz MD    atorvastatin 20 mg Oral Daily Grecia Mujica MD    azaTHIOprine 50 mg Oral Daily Grecia Mujica MD    donepezil 10 mg Oral HS Grecia Mujica MD    gemfibrozil 600 mg Oral BID AC Grecia Mujica MD    hydrocortisone 25 mg Rectal BID Magaly Cruz MD    insulin glargine 18 Units Subcutaneous HS Justyn Gu MD    insulin lispro 1-6 Units Subcutaneous HS Macrina Hunt PA-C    insulin lispro 2-12 Units Subcutaneous TID AC Grecia Mujica MD    insulin lispro 6 Units Subcutaneous TID With Meals Justyn Gu MD    levETIRAcetam 500 mg Oral BID Magaly Cruz MD    lisinopril 10 mg Oral Daily Grecia Mujica MD    LORazepam 0 5 mg Oral Q8H PRN Magaly Cruz MD    mesalamine 500 mg Oral 4x Daily Grecia Mujica MD    risperiDONE 0 5 mg Oral Q8H PRN Deloris Kim PA-C    sertraline 50 mg Oral HS Grecia Mujica MD    sodium chloride 50 mL/hr Intravenous Continuous Magaly Cruz MD Last Rate: 50 mL/hr (03/13/18 2106)     Continuous Infusions:   sodium chloride 50 mL/hr Last Rate: 50 mL/hr (03/13/18 2106)     PRN Meds:   acetaminophen    LORazepam    risperiDONE    Abnormal Labs/Diagnostic Results: Results from last 7 days  Lab Units 03/12/18  0520   03/10/18  1209   WBC Thousand/uL 8 60  < > 6 21   HEMOGLOBIN g/dL 14 5  < > 14 4   HEMATOCRIT % 44 3  < > 43 9   PLATELETS Thousands/uL 176  < > 153   NEUTROS PCT %  --   --  84*   LYMPHS PCT %  --   --  8*   LYMPHO PCT % 1*  --   --    MONOS PCT %  --   --  7   MONO PCT MAN % 3*  --   --    EOS PCT %  --   --  0   EOSINO PCT MANUAL % 0  -- --    < > = values in this interval not displayed      Results from last 7 days  Lab Units 03/13/18  0533   03/11/18  0526   SODIUM mmol/L 137  < > 137   POTASSIUM mmol/L 3 6  < > 3 9   CHLORIDE mmol/L 103  < > 99*   CO2 mmol/L 23  < > 22   BUN mg/dL 15  < > 13   CREATININE mg/dL 0 87  < > 0 71   CALCIUM mg/dL 9 2  < > 9 0   TOTAL PROTEIN g/dL  --   --  7 1   BILIRUBIN TOTAL mg/dL  --   --  0 80   ALK PHOS U/L  --   --  68   ALT U/L  --   --  16   AST U/L  --   --  20   GLUCOSE RANDOM mg/dL 122  < > 271*   < > = values in this interval not displayed          * I Have Reviewed All Lab Data Listed Above  * Additional Pertinent Lab Tests Reviewed: All Labs Within Last 24 Hours Reviewed     Imaging:     Imaging Reports Reviewed Today Include:   Imaging Personally Reviewed by Myself Includes:       Recent Cultures (last 7 days):         Results from last 7 days  Lab Units 03/11/18  1140 03/10/18  1216 03/10/18  1209   BLOOD CULTURE    --  No Growth at 48 hrs  No Growth at 48 hrs  URINE CULTURE   No Growth <1000 cfu/mL  --   --              Age/Sex: 65 y o  male     Assessment/Plan: Assessment:     Principal Problem:    Acute cystitis with hematuria  Active Problems:    Type 2 diabetes mellitus with hyperglycemia, without long-term current use of insulin (HCC)    Paroxysmal atrial fibrillation (HCC)    HTN (hypertension)    Hyperlipidemia    Weakness    Coronary artery disease involving native heart with angina pectoris (HCC)    History of CVA in adulthood    Aphasia as late effect of cerebrovascular accident    Seizure disorder ([facility] [facility] )        Plan:     # Acute encephalopathy possibly d/t metabolic encephalopathy given hyperglycemia, s/p urine cx doubt d/t TME as non significant colony ct  - underlying hx of underlying dementia  - s/p CT head no acute pathology  - Neurology on board     # ?  Syncope - not sure how reliable history is  - troponin neg x 3  - s/p echo  - hx of afib     # Pyuria - s/p urine cx; < 1K count thus d/c IV abx  - seem per Urology due to recurrent UTI, recommend to leave Swanson in place, voiding trial as outpatient     # DM II, hyperglycemia, elevated HgA1c  - s/p endocrinology consult  - initiated on insulin regimen  - better controlled     # paroxysmal atrial fibrillation - rate controlled, on eliqus     VTE Pharmacologic Prophylaxis:   Pharmacologic: Apixaban (Eliquis)  Mechanical VTE Prophylaxis in Place: Yes     Patient Centered Rounds: I have performed bedside rounds with nursing staff today      Discussions with Specialists or Other Care Team Provider:      Education and Discussions with Family / Patient:      Time Spent for Care: 30 minutes    More than 50% of total time spent on counseling and coordination of care as described above      Current Length of Stay: 3 day(s)     Current Patient Status: Inpatient   Certification Statement: The patient will continue to require additional inpatient hospital stay due to Awaiting placement     Discharge Plan / Estimated Discharge Date:      Code Status: Level 3 - DNAR and DNI    Discharge Plan: TO BE DETERMINED

## 2018-03-14 NOTE — PLAN OF CARE
Problem: Potential for Falls  Goal: Patient will remain free of falls  INTERVENTIONS:  - Assess patient frequently for physical needs  -  Identify cognitive and physical deficits and behaviors that affect risk of falls    -  [place] fall precautions as indicated by assessment   - Educate patient/family on patient safety including physical limitations  - Instruct patient to call for assistance with activity based on assessment  - Modify environment to reduce risk of injury  - Consider OT/PT consult to assist with strengthening/mobility   Outcome: Progressing      Problem: Prexisting or High Potential for Compromised Skin Integrity  Goal: Skin integrity is maintained or improved  INTERVENTIONS:  - Identify patients at risk for skin breakdown  - Assess and monitor skin integrity  - Assess and monitor nutrition and hydration status  - Monitor labs (i e  albumin)  - Assess for incontinence   - Turn and reposition patient  - Assist with mobility/ambulation  - Relieve pressure over bony prominences  - Avoid friction and shearing  - Provide appropriate hygiene as needed including keeping skin clean and dry  - Evaluate need for skin moisturizer/barrier cream  - Collaborate with interdisciplinary team (i e  Nutrition, Rehabilitation, etc )   - Patient/family teaching   Outcome: Progressing      Problem: PAIN - ADULT  Goal: Verbalizes/displays adequate comfort level or baseline comfort level  Interventions:  - Encourage patient to monitor pain and request assistance  - Assess pain using appropriate pain scale  - Administer analgesics based on type and severity of pain and evaluate response  - Implement non-pharmacological measures as appropriate and evaluate response  - Consider cultural and social influences on pain and pain management  - Notify physician/advanced practitioner if interventions unsuccessful or patient reports new pain   Outcome: Progressing      Problem: INFECTION - ADULT  Goal: Absence or prevention of progression during hospitalization  INTERVENTIONS:  - Assess and monitor for signs and symptoms of infection  - Monitor lab/diagnostic results  - Monitor all insertion sites, i e  indwelling lines, tubes, and drains  - Monitor endotracheal (as able) and nasal secretions for changes in amount and color  - [place] appropriate cooling/warming therapies per order  - Administer medications as ordered  - Instruct and encourage patient and family to use good hand hygiene technique  - Identify and instruct in appropriate isolation precautions for identified infection/condition   Outcome: Progressing    Goal: Absence of fever/infection during neutropenic period  INTERVENTIONS:  - Monitor WBC  - Implement neutropenic guidelines   Outcome: Progressing      Problem: SAFETY ADULT  Goal: Maintain or return to baseline ADL function  INTERVENTIONS:  -  Assess patient's ability to carry out ADLs; assess patient's baseline for ADL function and identify physical deficits which impact ability to perform ADLs (bathing, care of mouth/teeth, toileting, grooming, dressing, etc )  - Assess/evaluate cause of self-care deficits   - Assess range of motion  - Assess patient's mobility; develop plan if impaired  - Assess patient's need for assistive devices and provide as appropriate  - Encourage maximum independence but intervene and supervise when necessary  ¯ Involve family in performance of ADLs  ¯ Assess for home care needs following discharge   ¯ Request OT consult to assist with ADL evaluation and planning for discharge  ¯ Provide patient education as appropriate   Outcome: Progressing    Goal: Maintain or return mobility status to optimal level  INTERVENTIONS:  - Assess patient's baseline mobility status (ambulation, transfers, stairs, etc )    - Identify cognitive and physical deficits and behaviors that affect mobility  - Identify mobility aids required to assist with transfers and/or ambulation (gait belt, sit-to-stand, lift, walker, cane, etc )  - [place] fall precautions as indicated by assessment  - Record patient progress and toleration of activity level on Mobility SBAR; progress patient to next Phase/Stage  - Instruct patient to call for assistance with activity based on assessment  - Request Rehabilitation consult to assist with strengthening/weightbearing, etc    Outcome: Progressing      Problem: DISCHARGE PLANNING  Goal: Discharge to home or other facility with appropriate resources  INTERVENTIONS:  - Identify barriers to discharge w/patient and caregiver  - Arrange for needed discharge resources and transportation as appropriate  - Identify discharge learning needs (meds, wound care, etc )  - Arrange for interpretive services to assist at discharge as needed  - Refer to Case Management Department for coordinating discharge planning if the patient needs post-hospital services based on physician/advanced practitioner order or complex needs related to functional status, cognitive ability, or social support system   Outcome: Progressing      Problem: Knowledge Deficit  Goal: Patient/family/caregiver demonstrates understanding of disease process, treatment plan, medications, and discharge instructions  Complete learning assessment and assess knowledge base  Interventions:  - Provide teaching at level of understanding  - Provide teaching via preferred learning methods   Outcome: Progressing      Problem: Nutrition/Hydration-ADULT  Goal: Nutrient/Hydration intake appropriate for improving, restoring or maintaining nutritional needs  Monitor and assess patient's nutrition/hydration status for malnutrition (ex- brittle hair, bruises, dry skin, pale skin and conjunctiva, muscle wasting, smooth red tongue, and disorientation)  Collaborate with interdisciplinary team and initiate plan and interventions as ordered  Monitor patient's weight and dietary intake as ordered or per policy   Utilize nutrition screening tool and intervene per policy  Determine patient's food preferences and provide high-protein, high-caloric foods as appropriate       INTERVENTIONS:  - Monitor oral intake, urinary output, labs, and treatment plans  - Assess nutrition and hydration status and recommend course of action  - Evaluate amount of meals eaten  - Assist patient with eating if necessary   - Allow adequate time for meals  - Recommend/ encourage appropriate diets, oral nutritional supplements, and vitamin/mineral supplements  - Order, calculate, and assess calorie counts as needed  - Assess need for intravenous fluids  - Provide specific nutrition/hydration education as appropriate  - Include patient/family/caregiver in decisions related to nutrition   Outcome: Progressing

## 2018-03-15 LAB
C DIFF TOX GENS STL QL NAA+PROBE: NORMAL
GLUCOSE SERPL-MCNC: 122 MG/DL (ref 65–140)
GLUCOSE SERPL-MCNC: 136 MG/DL (ref 65–140)
GLUCOSE SERPL-MCNC: 170 MG/DL (ref 65–140)
GLUCOSE SERPL-MCNC: 173 MG/DL (ref 65–140)
GLUCOSE SERPL-MCNC: 197 MG/DL (ref 65–140)

## 2018-03-15 PROCEDURE — 99232 SBSQ HOSP IP/OBS MODERATE 35: CPT | Performed by: NURSE PRACTITIONER

## 2018-03-15 PROCEDURE — G8987 SELF CARE CURRENT STATUS: HCPCS

## 2018-03-15 PROCEDURE — 97167 OT EVAL HIGH COMPLEX 60 MIN: CPT

## 2018-03-15 PROCEDURE — G8988 SELF CARE GOAL STATUS: HCPCS

## 2018-03-15 PROCEDURE — 99232 SBSQ HOSP IP/OBS MODERATE 35: CPT | Performed by: INTERNAL MEDICINE

## 2018-03-15 PROCEDURE — 82948 REAGENT STRIP/BLOOD GLUCOSE: CPT

## 2018-03-15 RX ORDER — TAMSULOSIN HYDROCHLORIDE 0.4 MG/1
0.4 CAPSULE ORAL
Status: DISCONTINUED | OUTPATIENT
Start: 2018-03-15 | End: 2018-03-16 | Stop reason: HOSPADM

## 2018-03-15 RX ADMIN — MESALAMINE 500 MG: 250 CAPSULE ORAL at 17:25

## 2018-03-15 RX ADMIN — GEMFIBROZIL 600 MG: 600 TABLET, FILM COATED ORAL at 06:15

## 2018-03-15 RX ADMIN — APIXABAN 2.5 MG: 2.5 TABLET, FILM COATED ORAL at 17:25

## 2018-03-15 RX ADMIN — DONEPEZIL HYDROCHLORIDE 10 MG: 5 TABLET ORAL at 21:06

## 2018-03-15 RX ADMIN — LEVETIRACETAM 500 MG: 500 TABLET ORAL at 09:06

## 2018-03-15 RX ADMIN — AZATHIOPRINE 50 MG: 50 TABLET ORAL at 09:06

## 2018-03-15 RX ADMIN — SERTRALINE HYDROCHLORIDE 50 MG: 50 TABLET ORAL at 21:06

## 2018-03-15 RX ADMIN — TAMSULOSIN HYDROCHLORIDE 0.4 MG: 0.4 CAPSULE ORAL at 17:30

## 2018-03-15 RX ADMIN — HYDROCORTISONE ACETATE 25 MG: 25 SUPPOSITORY RECTAL at 09:26

## 2018-03-15 RX ADMIN — GEMFIBROZIL 600 MG: 600 TABLET, FILM COATED ORAL at 17:26

## 2018-03-15 RX ADMIN — INSULIN GLARGINE 18 UNITS: 100 INJECTION, SOLUTION SUBCUTANEOUS at 00:00

## 2018-03-15 RX ADMIN — LEVETIRACETAM 500 MG: 500 TABLET ORAL at 17:25

## 2018-03-15 RX ADMIN — LISINOPRIL 10 MG: 10 TABLET ORAL at 09:06

## 2018-03-15 RX ADMIN — APIXABAN 2.5 MG: 2.5 TABLET, FILM COATED ORAL at 09:06

## 2018-03-15 RX ADMIN — INSULIN LISPRO 2 UNITS: 100 INJECTION, SOLUTION INTRAVENOUS; SUBCUTANEOUS at 17:30

## 2018-03-15 RX ADMIN — AMLODIPINE BESYLATE 5 MG: 5 TABLET ORAL at 09:06

## 2018-03-15 RX ADMIN — MESALAMINE 500 MG: 250 CAPSULE ORAL at 12:20

## 2018-03-15 RX ADMIN — MESALAMINE 500 MG: 250 CAPSULE ORAL at 09:06

## 2018-03-15 RX ADMIN — INSULIN LISPRO 2 UNITS: 100 INJECTION, SOLUTION INTRAVENOUS; SUBCUTANEOUS at 12:21

## 2018-03-15 RX ADMIN — INSULIN GLARGINE 18 UNITS: 100 INJECTION, SOLUTION SUBCUTANEOUS at 21:06

## 2018-03-15 RX ADMIN — ATORVASTATIN CALCIUM 20 MG: 20 TABLET, FILM COATED ORAL at 09:06

## 2018-03-15 RX ADMIN — MESALAMINE 500 MG: 250 CAPSULE ORAL at 21:06

## 2018-03-15 NOTE — SOCIAL WORK
CM met with patient and wife-Ally in patient's room  Patient is unable to answer questions due to aphasia  Patient lives in a two story house with his wife  There are 14 steps to enter the house  Patient does not use DME at home  Patient has assistance with ADLs from his wife  Patient [facility]  Patient does have hhc hx however wife does not remember the name of the agency who serviced patient  Patient fills his prescriptions at [facility] in [place]  Wife states patient does not have mental health dx  Wife denies substance abuse currently 30 years ago patient stopped smoking  Patient wife -Alvina Tsai is Aubrie [place]  Patient is retired  Wife drives patient to doctor's appointments  Wife is requesting patient go to an STR  Referrals are made to choices  CM awaits responses  Nurse and doctor consulted  CM will follow patient's needs  CM reviewed discharge planning process including the following: identifying help at home, patient preference for discharge planning needs, pharmacy preference, and availability of treatment team to discuss questions or concerns patient and/or family may have regarding understanding medications and recognizing signs and symptoms once discharged  CM also encouraged patient to follow up with all recommended appointments after discharge  Patient advised of importance for patient and family to participate in managing patients medical well being

## 2018-03-15 NOTE — OCCUPATIONAL THERAPY NOTE
Occupational Therapy Evaluation         Patient Name: Chris Fonseca  XOOKAHLILK Date: 3/15/2018     03/15/18 7155   Note Type   Note type Eval/Treat   Restrictions/Precautions   Weight Bearing Precautions Per Order No   Other Precautions Contact/isolation; Impulsive; Chair Alarm; Bed Alarm;Multiple lines;Telemetry   Pain Assessment   Pain Assessment No/denies pain   Pain Score No Pain   Home Living   Type of Home House   Home Layout Two level  (full flight of stairs to 2nd floor)   Bathroom Shower/Tub Tub/shower unit   Bathroom Toilet Standard   Bathroom Equipment Grab bars in shower; Shower chair   [address] Cane;Walker   Additional Comments 3 in 1 commode    Prior Function   Level of [place] Needs assistance with ADLs and functional mobility  (per H&P; ambulates c RW c (A))   Lives With Spouse  (having difficulty caring for pt)   Receives Help From Family   ADL Assistance Needs assistance   IADLs Needs assistance   Falls in the last 6 months 1 to 4   Lifestyle   Autonomy Pt I/feeding able to walk short distance with Rw    Psychosocial   Patient Behaviors/Mood Calm   ADL   Where Assessed Edge of bed   Eating Assistance 5  Supervision/Setup   Grooming Assistance 4  Minimal Assistance   Grooming Deficit Verbal cueing   UB Bathing Assistance 4  Minimal Assistance   UB Bathing Deficit Verbal cueing; Increased time to complete   LB Bathing Assistance 2  Maximal Assistance   LB Bathing Deficit Verbal cueing   UB Dressing Assistance 4  Minimal Assistance   UB Dressing Deficit Verbal cueing;Supervision/safety   LB Dressing Assistance 2  Maximal Assistance   LB Dressing Deficit Verbal cueing;Supervision/safety; Increased time to complete   Toileting Assistance  2  Maximal Assistance   Toileting Deficit Verbal cueing;Supervison/safety; Increased time to complete   Functional Assistance 3  Moderate Assistance   Functional Deficit Increased time to complete;Supervision/safety;Verbal cueing; Impulsive   Bed Mobility   Rolling R 5  Supervision   Additional items Assist x 1;Bedrails; Increased time required;Verbal cues;LE management   Rolling L 5  Supervision   Additional items Assist x 1;HOB elevated; Bedrails; Increased time required;Verbal cues   Supine to Sit 4  Minimal assistance   Additional items Assist x 1;Bedrails; Increased time required;Verbal cues; Impulsive   Transfers   Sit to Stand 4  Minimal assistance   Additional items Assist x 1;Bedrails; Increased time required;Verbal cues; Impulsive   Stand to Sit 4  Minimal assistance   Additional items Assist x 1;Bedrails; Increased time required;Verbal cues   Functional Mobility   Functional Mobility 4  Minimal assistance   Additional Comments v/c for safety    Additional items Rolling walker   Balance   Static Sitting Normal   Dynamic Sitting Good   Static Standing Fair   Dynamic Standing Fair -   Activity Tolerance   Activity Tolerance Patient limited by fatigue   Nurse Made Aware NISHA Wolf verbalized    RUE Assessment   RUE Assessment WFL   LUE Assessment   LUE Assessment WFL   Hand Function   Gross Motor Coordination Functional   Vision-Basic Assessment   Current Vision No visual deficits   Cognition   Overall Cognitive Status Impaired   Arousal/Participation Alert; Responsive   Attention Attends with cues to redirect   Orientation Level Oriented to person   Memory Unable to assess   Following Commands Follows one step commands with increased time or repetition   Assessment   Limitation Decreased ADL status; Decreased UE strength;Decreased Safe judgement during ADL;Decreased endurance;Decreased cognition;Decreased self-care trans;Decreased high-level ADLs   Prognosis Fair   Goals   Patient Goals wife want pt to get stronger before return home    Barthel Index   Feeding 5   Bathing 0   Grooming Score 0   Dressing Score 5   Bladder Score 0   Bowels Score 0   Toilet Use Score 5   Transfers (Bed/Chair) Score 5 Mobility (Level Surface) Score 0   Stairs Score 0   Barthel Index Score 20   Modified Sarah Scale   Modified [place] Scale 4     Occupational Therapy goals: In 7-14 days:     1- Patient will verbalize and demonstrate use of energy conservation/ deep breathing technique and work simplification skills during functional activity with no verbal cues  2-Patient will verbalize and demonstrate good body mechanics and joint protection techniques during  ADLs/ IADLs with  verbal cues  3- Patient will increase OOB/ sitting tolerance to 2-4 hours per day for increased participation in self care and leisure tasks with no s/s of exertion  4-Patient will increase standing tolerance time to 5  minutes with unilateral UE support to complete sink level ADLs@ min A with vc's ll   5- Patient will increase sitting tolerance at edge of bed to 20 minutes to complete UB ADLs @ set up assist level  6- Patient will transfer bed to Chair / toilet at Set up assist level with AD as indicated  7- Patient will complete UB ADLs with set up assist  8- Patient will complete LB ADLs with min assist with the use of adaptive equipment  9- Patient will complete toileting hygiene with set up assist/ supervision for thoroughness    10-Patient/ Family  will demonstrate competency with UE Home Exercise Program

## 2018-03-15 NOTE — PLAN OF CARE
Problem: OCCUPATIONAL THERAPY ADULT  Goal: Performs self-care activities at highest level of function for planned discharge setting  See evaluation for individualized goals  See flowsheet documentation for full assessment, interventions and recommendations     Limitation: Decreased ADL status, Decreased UE strength, Decreased Safe judgement during ADL, Decreased endurance, Decreased cognition, Decreased self-care trans, Decreased high-level ADLs  Prognosis: Fair

## 2018-03-15 NOTE — PLAN OF CARE
Problem: DISCHARGE PLANNING - CARE MANAGEMENT  Goal: Discharge to post-acute care or home with appropriate resources  INTERVENTIONS:  - Conduct assessment to determine patient/family and health care team treatment goals, and need for post-acute services based on payer coverage, community resources, and patient preferences, and barriers to discharge  - Address psychosocial, clinical, and financial barriers to discharge as identified in assessment in conjunction with the patient/family and health care team  - Arrange appropriate level of post-acute services according to patient's   needs and preference and payer coverage in collaboration with the physician and health care team  - Communicate with and update the patient/family, physician, and health care team regarding progress on the discharge plan  - Arrange appropriate transportation to post-acute venues  Outcome: Progressing  CM met with patient and wife-Ally in patient's room  Patient is unable to answer questions due to aphasia  Patient lives in a two story house with his wife  There are 14 steps to enter the house  Patient does not use DME at home  Patient has assistance with ADLs from his wife  [facility]  Patient does have hhc hx however wife does not remember the name of the agency who serviced patient  Patient fills his prescriptions at [facility] in [place]  Wife states patient does not have mental health dx  Wife denies substance abuse currently 30 years ago patient stopped smoking  Patient wife -Edmundo Agent is Landon [place]  Patient is retired  Wife drives patient to doctor's appointments  Wife is requesting patient go to an STR  Referrals are made to choices  CM awaits responses  Nurse and doctor consulted  CM will follow patient's needs

## 2018-03-15 NOTE — PROGRESS NOTES
[facility] Medicine Progress Note  Patient: Nolan Driver 65 y o  male   MRN: [id number]  PCP: Dick Tse DO  Unit/Bed#: -01 Encounter: [id number]  Date Of Visit: 03/15/18    Assessment:    Principal Problem:    Acute cystitis with hematuria  Active Problems:    Type 2 diabetes mellitus with hyperglycemia, without long-term current use of insulin (HCC)    Paroxysmal atrial fibrillation (HCC)    HTN (hypertension)    Hyperlipidemia    Weakness    Coronary artery disease involving native heart with angina pectoris ([facility] [address] [facility] )    History of CVA in adulthood    Aphasia as late effect of cerebrovascular accident    Seizure disorder ([facility] [address] )      Plan:    # Acute encephalopathy possibly d/t metabolic encephalopathy given hyperglycemia, s/p urine cx doubt d/t TME as non significant colony ct  - underlying hx of underlying dementia  - s/p CT head no acute pathology  - Neurology on board  - per wife MS is at baseline    # ? Syncope - in speaking with wife not clearly syncope she said the patient had simply fallen out of the bed there was no LOC witnessed  - troponin neg x 3  - s/p echo  - hx of afib    # Pyuria/urinary retention   - s/p urine cx; < 1K count thus d/c IV abx  - seen per Urology recommended to leave Swanson in place, voiding trial as outpatient  - start on flomax  - status post renal ultrasound no hydronephrosis    # DM II, hyperglycemia, elevated HgA1c  - s/p endocrinology consult  - initiated on insulin regimen  - better controlled    # paroxysmal atrial fibrillation - rate controlled, on eliqus    # Diarrhea - negative C diff    # Hx of CVA resulting SZ  - CVA prophylaxis with Eliquis/statin  - on Keppra    VTE Pharmacologic Prophylaxis:   Pharmacologic: Apixaban (Eliquis)  Mechanical VTE Prophylaxis in Place: Yes    Patient Centered Rounds: I have performed bedside rounds with nursing staff today      Discussions with Specialists or Other Care Team Provider:     Education and Discussions with Family / Patient:  Patient's wife    Time Spent for Care: 30 minutes  More than 50% of total time spent on counseling and coordination of care as described above  Current Length of Stay: 5 day(s)    Current Patient Status: Inpatient   Certification Statement: The patient will continue to require additional inpatient hospital stay due to Awaiting placement    Discharge Plan / Estimated Discharge Date:  DC planning tomorrow    Code Status: Level 3 - DNAR and DNI      Subjective:   No acute events overnight  C diff has been ruled out  Wife at bedside  Objective:     Vitals:   Temp (24hrs), Av °F (36 7 °C), Min:97 8 °F (36 6 °C), Max:98 2 °F (36 8 °C)    HR:  [72-80] 73  Resp:  [16-18] 18  BP: (125-156)/(70-72) 156/72  SpO2:  [92 %] 92 %  Body mass index is 32 8 kg/m²  Input and Output Summary (last 24 hours): Intake/Output Summary (Last 24 hours) at 03/15/18 1716  Last data filed at 03/15/18 1300   Gross per 24 hour   Intake              240 ml   Output             1500 ml   Net            -1260 ml       Physical Exam:     Physical Exam   Constitutional: He appears well-developed  Neck: Neck supple  Cardiovascular: Normal rate, regular rhythm, normal heart sounds and intact distal pulses  Exam reveals no gallop and no friction rub  No murmur heard  Pulmonary/Chest: Effort normal and breath sounds normal  No respiratory distress  He has no wheezes  He has no rales  He exhibits no tenderness  Abdominal: Soft  Bowel sounds are normal  He exhibits no distension and no mass  There is no tenderness  There is no rebound and no guarding  Musculoskeletal: Normal range of motion  He exhibits no edema, tenderness or deformity  Neurological: He is alert  Oriented only to person  A phasic   Skin: Skin is warm and dry     Psychiatric:   Advanced dementia     Additional Data:     Labs:      Results from last 7 days  Lab Units 18  0520  03/10/18  1209   WBC Thousand/uL 8 60  < > 6 21 HEMOGLOBIN g/dL 14 5  < > 14 4   HEMATOCRIT % 44 3  < > 43 9   PLATELETS Thousands/uL 176  < > 153   NEUTROS PCT %  --   --  84*   LYMPHS PCT %  --   --  8*   LYMPHO PCT % 1*  --   --    MONOS PCT %  --   --  7   MONO PCT MAN % 3*  --   --    EOS PCT %  --   --  0   EOSINO PCT MANUAL % 0  --   --    < > = values in this interval not displayed  Results from last 7 days  Lab Units 03/13/18  0533  03/11/18  0526   SODIUM mmol/L 137  < > 137   POTASSIUM mmol/L 3 6  < > 3 9   CHLORIDE mmol/L 103  < > 99*   CO2 mmol/L 23  < > 22   BUN mg/dL 15  < > 13   CREATININE mg/dL 0 87  < > 0 71   CALCIUM mg/dL 9 2  < > 9 0   TOTAL PROTEIN g/dL  --   --  7 1   BILIRUBIN TOTAL mg/dL  --   --  0 80   ALK PHOS U/L  --   --  68   ALT U/L  --   --  16   AST U/L  --   --  20   GLUCOSE RANDOM mg/dL 122  < > 271*   < > = values in this interval not displayed  * I Have Reviewed All Lab Data Listed Above  * Additional Pertinent Lab Tests Reviewed: All Labs Within Last 24 Hours Reviewed    Imaging:    Imaging Reports Reviewed Today Include:   Imaging Personally Reviewed by Myself Includes:      Recent Cultures (last 7 days):       Results from last 7 days  Lab Units 03/14/18  1321 03/11/18  1140 03/10/18  1216 03/10/18  1209   BLOOD CULTURE   --   --  No Growth After 4 Days  No Growth After 4 Days     URINE CULTURE   --  No Growth <1000 cfu/mL  --   --    C DIFF TOXIN B  NEGATIVE for C difficle toxin by PCR    --   --   --        Last 24 Hours Medication List:     Current Facility-Administered Medications:  acetaminophen 650 mg Oral Q6H PRN Justin Mayo MD   amLODIPine 5 mg Oral Daily Marivel Woods MD   apixaban 2 5 mg Oral BID Justin Mayo MD   atorvastatin 20 mg Oral Daily Justin Mayo MD   azaTHIOprine 50 mg Oral Daily Grecia Murphy MD   donepezil 10 mg Oral HS Justin Maoy MD   gemfibrozil 600 mg Oral BID AC Justin Mayo MD   insulin glargine 18 Units Subcutaneous HS Tesha Stubbs MD   insulin lispro 1-6 Units Subcutaneous HS Bluford Hashimoto, PA-C   insulin lispro 2-12 Units Subcutaneous TID AC Greica Johnson MD   insulin lispro 6 Units Subcutaneous TID With Meals Christoper Moritz, MD   levETIRAcetam 500 mg Oral BID Cedric Red MD   lisinopril 10 mg Oral Daily Grecia Johnson MD   LORazepam 0 5 mg Oral Q8H PRN Cedric Red MD   mesalamine 500 mg Oral 4x Daily Grecia Johnson MD   risperiDONE 0 5 mg Oral Q8H PRN Bluford Hashimoto, PA-C   sertraline 50 mg Oral HS Cedric Red MD        Today, Patient Was Seen By: Katie Pearce DO    ** Please Note: This note has been constructed using a voice recognition system   **

## 2018-03-15 NOTE — PROGRESS NOTES
Progress Note - Princess Haro 65 y o  male MRN: [id number]    Unit/Bed#: -[facility] Encounter: [id number]      Assessment:  Mr Katerin Sadler is a 69-year-old male with history of CVA and significant residual; as well as, dementia  Who endured difficult catheterization and resultant gross hematuria  Urine cultures were negative and renal ultrasound did not demonstrate for  tract abnormality with the exception of bilateral simple renal cysts  Patient is afebrile and labs are within normal limits  Plan:  Patient remains quite debilitated and may require short-term rehab per documentation  Would proceed with void trial 1 week after discharge to rehab facility  Will also arrange for follow-up within 3-4 weeks  If trial of void is unsuccessful, catheter Re insertion may be necessary  However, will allow time for patient to convalesce  Begin Flomax  No further  intervention is indicated during this hospital stay  Please do not hesitate to contact our office with any questions or new  concerns  Refer to after visit summary for void trial instructions to be followed by skilled nursing staff at [facility]SNF  Subjective:  I am okay       Objective:     Vitals: Blood pressure 125/70, pulse 80, temperature 97 8 °F (36 6 °C), temperature source Oral, resp  rate 16, height 5' 7" (1 702 m), weight 95 kg (209 lb 7 oz), SpO2 94 %  ,Body mass index is 32 8 kg/m²        Intake/Output Summary (Last 24 hours) at 03/15/18 9686  Last data filed at 03/15/18 0530   Gross per 24 hour   Intake              370 ml   Output             1550 ml   Net            -1180 ml       Physical Exam: General appearance: alert and oriented, in no acute distress, appears stated age and cooperative  Head: Normocephalic, without obvious abnormality, atraumatic  Neck: no adenopathy, no carotid bruit, no JVD, supple, symmetrical, trachea midline and thyroid not enlarged, symmetric, no tenderness/mass/nodules  Lungs: clear to auscultation bilaterally  Heart: regular rate and rhythm, S1, S2 normal, no murmur, click, rub or gallop  Abdomen: soft, non-tender; bowel sounds normal; no masses,  no organomegaly  Extremities: extremities normal, warm and well-perfused; no cyanosis, clubbing, or edema  Pulses: 2+ and symmetric  Neurologic: Mental status: alertness: alert, orientation: person  Swanson secure and patent for clear yellow urine  Invasive Devices     Peripheral Intravenous Line            Peripheral IV 03/10/18 Left Forearm 4 days          Drain            Urethral Catheter Temperature probe 4 days              Lab Results   Component Value Date    WBC 8 60 03/12/2018    HGB 14 5 03/12/2018    HCT 44 3 03/12/2018    MCV 89 03/12/2018     03/12/2018     Lab Results   Component Value Date    GLUCOSE 122 03/13/2018    CALCIUM 9 2 03/13/2018     03/13/2018    K 3 6 03/13/2018    CO2 23 03/13/2018     03/13/2018    BUN 15 03/13/2018    CREATININE 0 87 03/13/2018       Lab, Imaging and other studies: I have personally reviewed pertinent reports

## 2018-03-15 NOTE — PLAN OF CARE
Problem: OCCUPATIONAL THERAPY ADULT  Goal: Performs self-care activities at highest level of function for planned discharge setting  See evaluation for individualized goals  See flowsheet documentation for full assessment, interventions and recommendations  Limitation: Decreased ADL status, Decreased UE strength, Decreased Safe judgement during ADL, Decreased endurance, Decreased cognition, Decreased self-care trans, Decreased high-level ADLs  Prognosis: Fair  Assessment: Pt is a 65 y o  male seen for OT evaluation s/p admit to [facility] on 3/10/2018 w/ Acute cystitis with hematuria  Comorbidities affecting pt's functional performance at time of assessment include: Dementia, DM2, A-fib, HTN, Hyperlipidemia, Hx CVA, Aphasia, seizure disorder  Personal factors affecting pt at time of IE include:steps to enter environment, difficulty performing ADLS, difficulty performing IADLS , limited insight into deficits, compliance and decreased initiation and engagement   Prior to admission, pt was living home with wife in a 2 story home with sravanthi to enter and FF to 2nd level  Pt performed ADLs on one level  Wife provided A/BADLs and he was I/IADLS and supervision with functional mobility and transfers w/o device  Pt was I/feeding only  Upon evaluation: Pt presents with confusion and impulsivity  He was able to follow 1 step commands with some vc's  He required min A for bed mobility, min/mod A for ADL tranfers and min A with vc during functional mobility   Toileting mod A / max A  pt scoring 20/100 on the Barthel index indicating Pt requires

## 2018-03-16 VITALS
SYSTOLIC BLOOD PRESSURE: 152 MMHG | OXYGEN SATURATION: 93 % | DIASTOLIC BLOOD PRESSURE: 68 MMHG | TEMPERATURE: 98 F | HEIGHT: 67 IN | BODY MASS INDEX: 32.21 KG/M2 | RESPIRATION RATE: 18 BRPM | HEART RATE: 61 BPM | WEIGHT: 205.25 LBS

## 2018-03-16 LAB
BACTERIA BLD CULT: NORMAL
BACTERIA BLD CULT: NORMAL
GLUCOSE SERPL-MCNC: 102 MG/DL (ref 65–140)
GLUCOSE SERPL-MCNC: 157 MG/DL (ref 65–140)
GLUCOSE SERPL-MCNC: 180 MG/DL (ref 65–140)

## 2018-03-16 PROCEDURE — 82948 REAGENT STRIP/BLOOD GLUCOSE: CPT

## 2018-03-16 PROCEDURE — 99239 HOSP IP/OBS DSCHRG MGMT >30: CPT | Performed by: INTERNAL MEDICINE

## 2018-03-16 RX ORDER — ACETAMINOPHEN 325 MG/1
650 TABLET ORAL EVERY 6 HOURS PRN
Qty: 30 TABLET | Refills: 0
Start: 2018-03-16

## 2018-03-16 RX ORDER — AMLODIPINE BESYLATE 5 MG/1
5 TABLET ORAL DAILY
Refills: 0
Start: 2018-03-17

## 2018-03-16 RX ORDER — TAMSULOSIN HYDROCHLORIDE 0.4 MG/1
0.4 CAPSULE ORAL
Refills: 0
Start: 2018-03-17

## 2018-03-16 RX ORDER — INSULIN GLARGINE 100 [IU]/ML
18 INJECTION, SOLUTION SUBCUTANEOUS
Qty: 10 ML | Refills: 0
Start: 2018-03-16

## 2018-03-16 RX ORDER — RISPERIDONE 0.5 MG/1
0.5 TABLET, FILM COATED ORAL EVERY 8 HOURS PRN
Qty: 10 TABLET | Refills: 0 | Status: SHIPPED | OUTPATIENT
Start: 2018-03-16

## 2018-03-16 RX ADMIN — APIXABAN 2.5 MG: 2.5 TABLET, FILM COATED ORAL at 08:10

## 2018-03-16 RX ADMIN — ATORVASTATIN CALCIUM 20 MG: 20 TABLET, FILM COATED ORAL at 08:10

## 2018-03-16 RX ADMIN — LEVETIRACETAM 500 MG: 500 TABLET ORAL at 08:10

## 2018-03-16 RX ADMIN — LEVETIRACETAM 500 MG: 500 TABLET ORAL at 18:24

## 2018-03-16 RX ADMIN — INSULIN LISPRO 2 UNITS: 100 INJECTION, SOLUTION INTRAVENOUS; SUBCUTANEOUS at 08:00

## 2018-03-16 RX ADMIN — APIXABAN 2.5 MG: 2.5 TABLET, FILM COATED ORAL at 18:23

## 2018-03-16 RX ADMIN — MESALAMINE 500 MG: 250 CAPSULE ORAL at 12:30

## 2018-03-16 RX ADMIN — LISINOPRIL 10 MG: 10 TABLET ORAL at 08:10

## 2018-03-16 RX ADMIN — TAMSULOSIN HYDROCHLORIDE 0.4 MG: 0.4 CAPSULE ORAL at 18:23

## 2018-03-16 RX ADMIN — AZATHIOPRINE 50 MG: 50 TABLET ORAL at 08:10

## 2018-03-16 RX ADMIN — AMLODIPINE BESYLATE 5 MG: 5 TABLET ORAL at 08:10

## 2018-03-16 RX ADMIN — MESALAMINE 500 MG: 250 CAPSULE ORAL at 18:25

## 2018-03-16 RX ADMIN — MESALAMINE 500 MG: 250 CAPSULE ORAL at 08:10

## 2018-03-16 RX ADMIN — GEMFIBROZIL 600 MG: 600 TABLET, FILM COATED ORAL at 06:26

## 2018-03-16 RX ADMIN — GEMFIBROZIL 600 MG: 600 TABLET, FILM COATED ORAL at 18:28

## 2018-03-16 NOTE — SOCIAL WORK
CM met with [employer] to inform the two choices she had would not accept due to his demented states  Patient was referred to the dementia unit at [facility] for [place] and [place]  Cynthia Domínguez for [facility] informed there are no beds available for patient in [place] however there is a bed available in [place]  CM met with Marco Antoniomohamud Millan about [facility] having availability  Ariana Millan was very emotional and teary about patient going to [facility] but she verbalized it she cannot take patient home and that patient does not recognize her at times  Ariana Millan states she knows she will probably not be able to visit patient everyday but she knows he has to go to a facility  CM provided emotional supportive services and offered iced water and tissue  Cynthia Domínguez is present and she was able to spend one on one time with wife - Ariana Millan to answer questions  Patient has scheduled gurmey transportation with EMS - [phone] at [facility] today  Marilee is notified and she is thankful for the transport because she has a hard time driving at night  CM informed [employer] about the IMM and was given a copy  The IMM is in the chart  Patient's PASSAR and MA 51 was sent to [facility] for [facility] at [place] - [phone] and fax#  [phone]  Nurse and doctor are notified and consulted

## 2018-03-16 NOTE — NURSING NOTE
Patient discharged to facility via stretcher transport due to dementia and level of ability to follow commands for safety

## 2018-03-16 NOTE — DISCHARGE SUMMARY
Discharge Summary - [place] [address] Internal Medicine    Patient Information: Padma Carver 65 y o  male MRN: [id number]  Unit/Bed#: -01 Encounter: [id number]    Discharging Physician / Practitioner: Humaira Gamez DO  PCP: Gabriela Bello DO  Admission Date: 3/10/2018  Discharge Date: 03/16/18    Reason for Admission:  Altered mental status    Discharge Diagnoses: Active Problems:    Type 2 diabetes mellitus with hyperglycemia, without long-term current use of insulin (HCC)    Paroxysmal atrial fibrillation (HCC)    HTN (hypertension)    Hyperlipidemia    Weakness    Coronary artery disease involving native heart with angina pectoris (HCC)    History of CVA in adulthood    Aphasia as late effect of cerebrovascular accident    Seizure disorder [facility])  Resolved Problems:    * No resolved hospital problems  *    HPI: Padma Carver is a 65 y o  male w/PMH of HTN, HLP, T2DM, CAD s/p CABG, seizure disorder, CVA w/residual aphasia, UC and dementia who presents with AMS  HE wound found on the floor by his wife  She states that this has happened in the past because he likes to play with the dogs  She does not believe that he fell out of bed  He wasrecently treated for an E coli UTI with Keflex  Patient is unable to provide any history; therefore, all of the information was obtained by the patient's wife and ED records  His wife states that he has progressively become become weaker the past couple of days  He is able to communicate with her but has residual aphasia from the CVA  Along with his dementia, she states that he is unable to answer questions appropriately  As per the wife, the patient states that he keeps on stating that he needs to urinate  She did not notice a foul smell; however, EMS and the ED RNs reported that his urine was foul smelling he thinks that he is dehydrated  He is able to tolerate regular consistency food without difficulty  He ambulates with assistance   His wife reports that she only gets about 1 5 hrs of sleep a night due to all of his needs  She states that he is unable to express when something is wrong  She believes that he does not feel any pain  He does not have a living will or POA  His wife states that she wants his code status to be a DNR  She understands that neither CPR nor intubation will be performed in the event his heart stops beating or he stops breathing       In the ED, labs were drawn  CT head w/o contrast was obtained  Patient received IVF NS 1 L x 1 and Ceftriaxone 1 gm IVPB x 1  Consultations During Hospital Stay:  · Neurology  · Urology  · Endocrinology  · Cardiology    Procedures Performed:     · Swanson placement  · Echocardiogram the  · Renal ultrasound    Significant Findings:     · Refer to hospital course    Incidental Findings:   ·     Test Results Pending at Discharge (will require follow up):   ·      Outpatient Tests Requested:  · CBC, BMP in 1-2 weeks    Complications:  None    Hospital Course:     # Acute encephalopathy possibly d/t metabolic encephalopathy given hyperglycemia, s/p urine cx doubt d/t TME as non significant colony ct to require tx thus abx was discontinued  - underlying hx of underlying advanced dementia  - s/p CT head no acute pathology  - Neurology on board  - per wife MS confirmed patient is at baseline     # ? Syncope - in speaking with wife not clearly syncope she said the patient had simply fallen out of the bed there was no LOC witnessed  - troponin neg x 3  - s/p echo  - hx of afib     # Pyuria/urinary retention   - s/p urine cx; < 1K count thus d/c IV abx  - seen per Urology recommended to leave Swanson in place, voiding trial as outpatient in 1 week    F/u with urology  - started on flomax  - status post renal ultrasound no hydronephrosis     # DM II, hyperglycemia, elevated HgA1c  - s/p endocrinology consult  - initiated on insulin regimen  - better controlled     # paroxysmal atrial fibrillation - rate controlled, on eliqus     # Diarrhea, resolved - negative C diff     # Hx of CVA resulting SZ  - CVA prophylaxis with Eliquis/statin  - on Keppra    Disp: D/c to SNF, dementia unit  Plan of care extensively discussed with patient's wife at length  Condition at Discharge: stable     Discharge Day Visit / Exam:     Subjective:  No acute events overnight  Vitals: Blood Pressure: 152/68 (03/16/18 1500)  Pulse: 61 (03/16/18 1500)  Temperature: 98 °F (36 7 °C) (03/16/18 1500)  Temp Source: Oral (03/16/18 1500)  Respirations: 18 (03/16/18 1500)  Height: 5' 7" (170 2 cm) (03/10/18 1007)  Weight - Scale: 93 1 kg (205 lb 4 oz) (03/16/18 0600)  SpO2: 93 % (03/16/18 1500)  Exam:   Physical Exam   Constitutional: He appears well-developed  Neck: Neck supple  Cardiovascular: Normal rate, regular rhythm, normal heart sounds and intact distal pulses  Exam reveals no gallop and no friction rub  No murmur heard  Pulmonary/Chest: Effort normal and breath sounds normal  No respiratory distress  He has no wheezes  He has no rales  He exhibits no tenderness  Abdominal: Soft  Bowel sounds are normal  He exhibits no distension and no mass  There is no tenderness  There is no rebound and no guarding  Musculoskeletal: Normal range of motion  He exhibits no edema, tenderness or deformity  Neurological:   Answers to name  Aphasic   Skin: Skin is warm and dry  Psychiatric:   Advanced dementia       Discharge instructions/Information to patient and family:   See after visit summary for information provided to patient and family  Provisions for Follow-Up Care:  See after visit summary for information related to follow-up care and any pertinent home health orders        Disposition:     [facility]    For Discharges to [facility] SNF:   · Not Applicable to this Patient - Not Applicable to this Patient    Planned Readmission:  No     Discharge Statement:  I spent more than 30 minutes discharging the patient  This time was spent on the day of discharge  I had direct contact with the patient on the day of discharge  Greater than 50% of the total time was spent examining patient, answering all patient questions, arranging and discussing plan of care with patient as well as directly providing post-discharge instructions  Additional time then spent on discharge activities  Discharge Medications:  See after visit summary for reconciled discharge medications provided to patient and family  ** Please Note: Dragon 360 Dictation voice to text software may have been used in the creation of this document   **

## 2018-03-16 NOTE — DISCHARGE INSTRUCTIONS
Anxiety   WHAT YOU SHOULD KNOW:   Anxiety is a condition that causes you to feel excessive worry, uneasiness, or fear  Family or work stress, smoking, caffeine, and alcohol can increase your risk for anxiety  Certain medicines or health conditions can also increase your risk  Anxiety may begin gradually, and can become a long-term condition if it is not managed or treated  AFTER YOU LEAVE:   Medicines:   · Medicines  can help you feel more calm and relaxed, and decrease your symptoms  · Take your medicine as directed  Contact your healthcare provider if you think your medicine is not helping or if you have side effects  Tell him if you are allergic to any medicine  Keep a list of the medicines, vitamins, and herbs you take  Include the amounts, and when and why you take them  Bring the list or the pill bottles to follow-up visits  Carry your medicine list with you in case of an emergency  Follow up with your healthcare provider within 2 weeks or as directed:  Write down your questions so you remember to ask them during your visits  Manage anxiety:   · Go to counseling as directed  Cognitive behavioral therapy can help you understand and change how you react to events that trigger your symptoms  · Find ways to manage your symptoms  Activities such as exercise, meditation, or listening to music can help you relax  · Practice deep breathing  Breathing can change how your body reacts to stress  Focus on taking slow, deep breaths several times a day, or during an anxiety attack  Breathe in through your nose, and out through your mouth  · Avoid caffeine  Caffeine can make your symptoms worse  Avoid foods or drinks that are meant to increase your energy level  · Limit or avoid alcohol  Ask your healthcare provider if alcohol is safe for you  You may not be able to drink alcohol if you take certain anxiety or depression medicines  Limit alcohol to 1 drink per day if you are a woman   Limit alcohol to 2 drinks per day if you are a man  A drink of alcohol is 12 ounces of beer, 5 ounces of wine, or 1½ ounces of liquor  Contact your healthcare provider if:   · Your symptoms get worse or do not get better with treatment  · You think your medicine may be causing side effects  · Your anxiety keeps you from doing your regular daily activities  · You have new symptoms since your last visit  · You have questions or concerns about your condition or care  Seek care immediately or call 911 if:   · You have chest pain, tightness, or heaviness that may spread to your shoulders, arms, jaw, neck, or back  · You feel like hurting yourself or someone else  · You feel dizzy, lightheaded, or faint  © 2014 [address] is for End User's use only and may not be sold, redistributed or otherwise used for commercial purposes  All illustrations and images included in CareNotes® are the copyrighted property of [employer] M [employer]  or [employer]  The above information is an  only  It is not intended as medical advice for individual conditions or treatments  Talk to your doctor, nurse or pharmacist before following any medical regimen to see if it is safe and effective for you  Benign Prostatic Hypertrophy   WHAT YOU NEED TO KNOW:   Benign prostatic hypertrophy (BPH) is a condition that causes your prostate gland to grow larger than normal  The prostate gland is the male sex gland that produces a fluid that is part of semen  It is about the size of a walnut and it is located under the bladder  As the prostate grows, it can squeeze the urethra  This can block urine flow and cause urinary problems  DISCHARGE INSTRUCTIONS:   Medicines:   · Alpha blockers: This medicine relaxes the muscles in your prostate and bladder  It may help you urinate more easily  · 5 alpha reductase inhibitors:   These medicines block the production of a hormone that causes the prostate to get larger  It may help slow the growth of the prostate or shrink the prostate  · Take your medicine as directed  Contact your healthcare provider if you think your medicine is not helping or if you have side effects  Tell him or her if you are allergic to any medicine  Keep a list of the medicines, vitamins, and herbs you take  Include the amounts, and when and why you take them  Bring the list or the pill bottles to follow-up visits  Carry your medicine list with you in case of an emergency  Follow up with your healthcare provider as directed:  Write down your questions so you remember to ask them during your visits  Manage BPH:   · Do not let your bladder get too full before you empty it  Urinate when you feel the urge  · Limit alcohol  Do not drink large amounts of any liquid at one time  · Decrease the amount of salt you eat  Examples of salty foods are chips, cured meats, and canned soups  Do not use table salt  · Healthcare providers may tell you not to eat spicy foods such as chilli peppers  This may help you find out if spicy food makes your BPH symptoms worse  · You may have sex if you feel well  Contact your healthcare provider if:   · There is a large amount of blood in your urine  · Your signs and symptoms get worse  · You have a fever  · You have questions or concerns about your condition or care  Seek care immediately if:   · You are unable to urinate  · Your bladder feels very full and painful  © 2017 [address] Information is for End User's use only and may not be sold, redistributed or otherwise used for commercial purposes  All illustrations and images included in CareNotes® are the copyrighted property of A D A M , Inc  or Johnathan Trivedi  The above information is an  only  It is not intended as medical advice for individual conditions or treatments   Talk to your doctor, nurse or pharmacist before following any medical regimen to see if it is safe and effective for you  Dementia   WHAT YOU NEED TO KNOW:   What is dementia? Dementia is a condition that causes loss of memory, thought control, and judgment  Alzheimer disease is the most common cause of dementia  Other common causes are loss of blood flow or nerve damage in the brain, and long-term alcohol or drug use  Dementia cannot be cured or prevented, but treatment may slow or reduce your symptoms  What increases my risk for dementia? · A family history of dementia    · Diseases such as diabetes or high blood pressure     · A head injury, brain tumor, or stroke    · Toxins such as alcohol or cigarette smoke     · Lack of activity or exercise    · Viruses and bacteria that cause illnesses such as HIV and syphilis  What are the signs and symptoms of dementia? Dementia may develop quickly over a few months after a head injury or stroke  It may develop slowly over many years if you have Alzheimer disease  Your memory and other mental abilities may decline steadily  They may stay the same for a time and then decline again  You may have any of the following:  · Loss of short-term memory, followed by loss of long-term memory    · Trouble remembering to go to the bathroom to urinate or have a bowel movement    · Anger, or violent behavior     · Depression, anxiety, or hallucinations (you see or hear things that are not real)  How is dementia diagnosed? Your healthcare provider will ask you or someone close to you about your symptoms  He will ask when your symptoms began, and if they have gotten worse with time  He may also ask if you have any family members with dementia  · Memory testing  will be done regularly so healthcare providers can monitor memory changes over time   Healthcare providers will test your long-term memory by asking questions about how much you remember from the past  They will also test your short-term memory by asking you to remember new facts  · Blood tests  may be used to rule out any other conditions that could be causing your symptoms  Some temporary conditions may be similar to dementia but can be treated  · An MRI or CT scan  can help healthcare providers find damage to your brain caused by dementia  The pictures may also show an injury or blood flow problems  You may be given contrast liquid before the pictures are taken  Tell the healthcare provider if you have ever had an allergic reaction to contrast liquid  Do not enter the MRI room with anything metal  Metal can cause serious injury  Tell the healthcare provider if you have any metal in or on your body  How is dementia treated? The goal of treatment is to help you keep your current health for as long as possible  You may need any of the following:  · Dementia medicines  may be used to help slow the decline in your memory  · Antipsychotics  may be used to help improve your behavior, and control anger or violence  · Antianxiety medicine  may be used to help reduce anxiety and keep you calm  · Antidepressants  may be used to help improve your mood and reduce your symptoms of depression  How can I manage my dementia? You may begin to need an in-home aide to help you remember your daily tasks  Ask your healthcare provider for a list of organizations that can help  It is best to arrange for help while you are thinking clearly  The following may also help you manage your dementia:  · Keep your mind and body active  Do activities that you love, such as art, gardening, or listening to music  Call or visit people often  This will keep your social skills sharp, and may help reduce depression  · Take all of your medicines as directed  This will help control medical conditions, such as high blood pressure, high cholesterol, and diabetes  · Write daily schedules and routines    Record medical appointments, times to take your medicines, meal times, or any other things to remember  Write down reminders to use the bathroom if you have trouble remembering  You may need to ask someone to write things down for you  · Place clocks and calendars where you can see them  This will help you remember appointments and tasks  · Do not smoke  Nicotine and other chemicals in cigarettes and cigars can cause lung damage  Ask your healthcare provider for information if you currently smoke and need help to quit  E-cigarettes or smokeless tobacco still contain nicotine  Talk to your healthcare provider before you use these products  · Eat healthy foods  Examples are fruits, vegetables, whole-grain breads, low-fat dairy products, beans, lean meats, and fish  Ask if you need to be on a special diet  When should I or someone close to me seek immediate care? · You have signs of delirium, such as extreme confusion, and seeing or hearing things that are not there  · You become angry or violent, and cannot be calmed down  · You faint and cannot be woken  When should I or someone close to me contact my healthcare provider? · You have a fever  · You have increased confusion, behavior, or mood changes  · You have questions or concerns about your condition or care  CARE AGREEMENT:   You have the right to help plan your care  Learn about your health condition and how it may be treated  Discuss treatment options with your caregivers to decide what care you want to receive  You always have the right to refuse treatment  The above information is an  only  It is not intended as medical advice for individual conditions or treatments  Talk to your doctor, nurse or pharmacist before following any medical regimen to see if it is safe and effective for you  © 2017 [address] Information is for End User's use only and may not be sold, redistributed or otherwise used for commercial purposes   All illustrations and images included in CareNotes® are the copyrighted property of [employer]  or Johnathan Trivedi  A-fib (Atrial Fibrillation)   WHAT YOU NEED TO KNOW:   A-fib may come and go, or it may be a long-term condition  A-fib can cause blood clots, stroke, or heart failure  These conditions may become life-threatening  It is important to treat and manage a-fib to help prevent a blood clot, stroke, or heart failure  DISCHARGE INSTRUCTIONS:   Call 911 for any of the following:   · You have any of the following signs of a heart attack:      ¨ Squeezing, pressure, or pain in your chest that lasts longer than 5 minutes or returns    ¨ Discomfort or pain in your back, neck, jaw, stomach, or arm     ¨ Trouble breathing    ¨ Nausea or vomiting    ¨ Lightheadedness or a sudden cold sweat, especially with chest pain or trouble breathing    · You have any of the following signs of a stroke:      ¨ Numbness or drooping on one side of your face     ¨ Weakness in an arm or leg    ¨ Confusion or difficulty speaking    ¨ Dizziness, a severe headache, or vision loss  Seek care immediately if:  You have any of the following signs of a blood clot:  · You feel lightheaded, are short of breath, and have chest pain  · You cough up blood  · You have swelling, redness, pain, or warmth in your arm or leg  Contact your cardiologist or healthcare provider if:   · Your heart rate is higher than your healthcare provider said it should be  · You have new or worsening swelling in your legs, feet, ankles, or abdomen  · You are short of breath, even at rest      · You have questions or concerns about your condition or care  Medicines: You may need any of the following:  · Heart medicines  help control your heart rate and rhythm  You may need more than one medicine to treat your symptoms  · Blood thinners    help prevent blood clots  Examples of blood thinners include heparin and warfarin  Clots can cause strokes, heart attacks, and death   The following are general safety guidelines to follow while you are taking a blood thinner:    ¨ Watch for bleeding and bruising while you take blood thinners  Watch for bleeding from your gums or nose  Watch for blood in your urine and bowel movements  Use a soft washcloth on your skin, and a soft toothbrush to brush your teeth  This can keep your skin and gums from bleeding  If you shave, use an electric shaver  Do not play contact sports  ¨ Tell your dentist and other healthcare providers that you take anticoagulants  Wear a bracelet or necklace that says you take this medicine  ¨ Do not start or stop any medicines unless your healthcare provider tells you to  Many medicines cannot be used with blood thinners  ¨ Tell your healthcare provider right away if you forget to take the medicine, or if you take too much  ¨ Warfarin  is a blood thinner that you may need to take  The following are things you should be aware of if you take warfarin  § Foods and medicines can affect the amount of warfarin in your blood  Do not make major changes to your diet while you take warfarin  Warfarin works best when you eat about the same amount of vitamin K every day  Vitamin K is found in green leafy vegetables and certain other foods  Ask for more information about what to eat when you are taking warfarin  § You will need to see your healthcare provider for follow-up visits when you are on warfarin  You will need regular blood tests  These tests are used to decide how much medicine you need  · Antiplatelets , such as aspirin, help prevent blood clots  Take your antiplatelet medicine exactly as directed  These medicines make it more likely for you to bleed or bruise  If you are told to take aspirin, do not take acetaminophen or ibuprofen instead  · Take your medicine as directed  Contact your healthcare provider if you think your medicine is not helping or if you have side effects   Tell him or her if you are allergic to any medicine  Keep a list of the medicines, vitamins, and herbs you take  Include the amounts, and when and why you take them  Bring the list or the pill bottles to follow-up visits  Carry your medicine list with you in case of an emergency  Follow up with your cardiologist as directed: You will need regular blood tests and monitoring  Write down your questions so you remember to ask them during your visits  Manage A-fib:   · Know your target heart rate  Learn how to take your pulse and monitor your heart rate  · Manage other health conditions  This includes high blood pressure, sleep apnea, thyroid disease, diabetes, and other heart conditions  Take medicine as directed and follow your treatment plan  · Limit or do not drink alcohol  Alcohol can make a-fib hard to manage  Ask your healthcare provider if it is safe for you to drink alcohol  A drink of alcohol is 12 ounces of beer, 5 ounces of wine, or 1½ ounces of liquor  · Do not smoke  Nicotine and other chemicals in cigarettes and cigars can cause heart and lung damage  Ask your healthcare provider for information if you currently smoke and need help to quit  E-cigarettes or smokeless tobacco still contain nicotine  Talk to your healthcare provider before you use these products  · Eat heart-healthy foods  Heart healthy foods will help keep your cholesterol low  These include fruits, vegetables, whole-grain breads, low-fat dairy products, beans, lean meats, and fish  Replace butter and margarine with heart-healthy oils such as olive oil and canola oil  · Maintain a healthy weight  Ask your healthcare provider how much you should weigh  Ask him to help you create a weight loss plan if you are overweight  · Exercise for 30 minutes  most days of the week  Ask your healthcare provider about the best exercise plan for you    © 2017 [address] Information is for End User's use only and may not be sold, redistributed or otherwise used for commercial purposes  All illustrations and images included in CareNotes® are the copyrighted property of [employer]  or Johnathan Trivedi  The above information is an  only  It is not intended as medical advice for individual conditions or treatments  Talk to your doctor, nurse or pharmacist before following any medical regimen to see if it is safe and effective for you  Glez Cath Care and void trial instructions---Maintain glez catheter to straight drainage  May use leg bag and shower  May flush TID prn using Stephania syringe and 120 ml NSS  May use more saline ad jeramy to prevent/treat cath obstruction  Remove catheter on 8th day rehab by 0600 hrs  Bladder scan if no void or less than 200ml in 6hrs  Straight cath for bladder scan >350ml and repeat process  If patient requires straight cath x3 , re-insert glez and call for Urologist follow-up  Information above  Glez can remain in place for up to 4 weeks at a time  Glez placed at [facility] on March 12, 2018        Glez Catheter Placement and Care   WHAT YOU NEED TO KNOW:   A Glez catheter is a sterile tube that is inserted into your bladder to drain urine  It is also called an indwelling urinary catheter  The tip of the catheter has a small balloon filled with solution that holds the catheter inside your bladder  DISCHARGE INSTRUCTIONS:   Seek care immediately if:   · Your catheter comes out  · You suddenly have material that looks like sand in the tubing or drainage bag  · No urine is draining into the bag and you have checked the system  · You have pain in your hip, back, pelvis, or lower abdomen  · You are confused or cannot think clearly  Contact your healthcare provider if:   · You have a fever  · You have bladder spasms for more than 1 day after the catheter is placed  · You see blood in the tubing or drainage bag      · You have a rash or itching where the catheter tube is secured to your skin  · Urine leaks from or around the catheter, tubing, or drainage bag  · The closed drainage system has accidently come open or apart  · You see a layer of crystals inside the tubing  · You have questions or concerns about your condition or care  Care for your Swanson catheter:   · Clean your genital area 2 times every day  Clean your catheter and the area around where it was inserted  Use soap and water  Clean your anal opening and catheter area after every bowel movement  · Secure the catheter tube  so you do not pull or move the catheter  This helps prevent pain and bladder spasms  Healthcare providers will show you how to use medical tape or a strap to secure the catheter tube to your body  · Keep a closed drainage system  Your Swanson catheter should always be attached to the drainage bag to form a closed system  Do not disconnect any part of the closed system unless you need to change the bag  Care for your drainage bag:   · Ask if a leg bag is right for you  A leg bag can be worn under your clothes  Ask your healthcare provider for more information about a leg bag  · Keep the drainage bag below the level of your waist   This helps stop urine from moving back up the tubing and into your bladder  Do not loop or kink the tubing  This can cause urine to back up and collect in your bladder  Do not let the drainage bag touch or lie on the floor  · Empty the drainage bag when needed  The weight of a full drainage bag can be painful  Empty the drainage bag every 3 to 6 hours or when it is ? full  · Clean and change the drainage bag as directed  Ask your healthcare provider how often you should change the drainage bag and what cleaning solution to use  Wear disposable gloves when you change the bag  Do not allow the end of the catheter or tubing to touch anything  Clean the ends with an alcohol pad before you reconnect them    What to do if problems develop:   · No urine is draining into the bag:      ¨ Check for kinks in the tubing and straighten them out  ¨ Check the tape or strap used to secure the catheter tube to your skin  Make sure it is not blocking the tube  ¨ Make sure you are not sitting or lying on the tubing  ¨ Make sure the urine bag is hanging below the level of your waist     · Urine leaks from or around the catheter, tubing, or drainage bag:  Check if the closed drainage system has accidently come open or apart  Clean the catheter and tubing ends with a new alcohol pad and reconnect them  Prevent an infection:   · Wash your hands often  Wash before and after you touch your catheter, tubing, or drainage bag  Use soap and water  Wear clean disposable gloves when you care for your catheter or disconnect the drainage bag  Wash your hands before you prepare or eat food  · Drink liquids as directed  Ask your healthcare provider how much liquid to drink each day and which liquids are best for you  Liquids will help flush your kidneys and bladder to help prevent infection  Follow up with your healthcare provider as directed:  Write down your questions so you remember to ask them during your visits  © 2017 [address] [facility] Information is for End User's use only and may not be sold, redistributed or otherwise used for commercial purposes  All illustrations and images included in CareNotes® are the copyrighted property of A D [employer] M [employer] Inc  or Johnathan Trivedi  The above information is an  only  It is not intended as medical advice for individual conditions or treatments  Talk to your doctor, nurse or pharmacist before following any medical regimen to see if it is safe and effective for you  Diabetic Hyperglycemia   WHAT YOU NEED TO KNOW:   Diabetic hyperglycemia is a blood glucose (sugar) level that is higher than your healthcare provider recommends   You may have increased thirst and urinate more often than usual  Over time, uncontrolled diabetes can damage your nerves, blood vessels, tissues, and organs  That is why it is important to manage diabetic hyperglycemia  Without treatment, diabetic hyperglycemia can lead to diabetic ketoacidosis (DKA) or hyperglycemic hyperosmolar state (HHS)  These are serious conditions that can become life-threatening  DISCHARGE INSTRUCTIONS:   Seek care immediately if:   · You have shortness of breath  · Your breath smells fruity  · You have nausea and vomiting  · You have symptoms of dehydration, such as dark yellow urine, dry mouth and lips, and dry skin  Contact your healthcare provider if:   · You continue to have higher blood sugar levels than your healthcare provider recommends  · Your blood sugar level is over 240 mg/dl and  you have ketones in your urine  · You have questions or concerns about your condition or care  Medicines:   · Medicines  such as insulin and hypoglycemic medicine decrease blood sugar levels  · Take your medicine as directed  Contact your healthcare provider if you think your medicine is not helping or if you have side effects  Tell him or her if you are allergic to any medicine  Keep a list of the medicines, vitamins, and herbs you take  Include the amounts, and when and why you take them  Bring the list or the pill bottles to follow-up visits  Carry your medicine list with you in case of an emergency  Follow up with your healthcare provider or specialist as directed: Your healthcare provider may refer you to a dietitian or diabetes specialist  Write down your questions so you remember to ask them during your visits  Manage diabetic hyperglycemia:   · If you take diabetes medicine or insulin, take it as directed  Missed or wrong doses can cause your blood sugar to go up  · Tell your healthcare provider if you continue to have trouble managing your blood sugar    He may change the type, amount, or timing of your diabetes medicine or insulin  If you do not take diabetes medicine or insulin, you may need to start  · Work with your healthcare provider to develop a sick day plan  Illness can cause your blood sugar to rise  A sick day plan helps you control your blood sugar level when you are sick  Prevent diabetic hyperglycemia:   · Check your blood sugar levels regularly  Ask your healthcare provider how often to check your blood sugar and what your levels should be  · Follow your meal plan  Your blood sugar can go up if you eat a large meal or you eat more carbohydrates than recommended  Work with a dietitian to develop a meal plan that is right for you  · Exercise regularly  to help lower your blood sugar when it is high  It can also keep your blood sugar levels steady over time  Exercise for at least 30 minutes, 5 days a week  Include muscle strengthening activities 2 days each week  Do not sit for longer than 90 minutes at a time  Work with your healthcare provider to create an exercise plan  Children should get at least 60 minutes of physical activity each day  · Check your ketones before exercise  if your blood sugar level is above 240 mg/dl  Do not exercise if you have ketones in your urine,  because your blood sugar level may rise even more  Ask your healthcare provider how to lower your blood sugar when you have ketones  © 2017 [address] [facility] Information is for End User's use only and may not be sold, redistributed or otherwise used for commercial purposes  All illustrations and images included in CareNotes® are the copyrighted property of [employer]  or [address]  The above information is an  only  It is not intended as medical advice for individual conditions or treatments  Talk to your doctor, nurse or pharmacist before following any medical regimen to see if it is safe and effective for you

## 2018-04-02 ENCOUNTER — TELEPHONE (OUTPATIENT)
Dept: FAMILY MEDICINE CLINIC | Facility: CLINIC | Age: 78
End: 2018-04-02

## 2018-04-02 NOTE — TELEPHONE ENCOUNTER
Jerrica Lau would like to speak with you about pt  Pt  at the [facility] yesterday  He became hypotensive at the nursing home, was sent to the ER unresponsive but alive  He  while been treated at the Emergency room  Jerrica Lau wants to know if you are willing to sign the death certificate, also needs pt's medical history

## 2018-04-02 NOTE — TELEPHONE ENCOUNTER
I will not sign the death cert as I was not the one over seeing his treatment at the time, the ER doctor is supposed to do this

## 2018-04-02 NOTE — TELEPHONE ENCOUNTER
[facility] office called back and said that the ER doctor in [facility] will not sign the death certificate - the primary care doctor has to  - please call back

## 2018-04-02 NOTE — TELEPHONE ENCOUNTER
I will not because I do not have a cause of death, nor was I present at the time of his death, nor was he under my direct care at the time

## 2018-05-21 ENCOUNTER — GENERIC CONVERSION - ENCOUNTER (OUTPATIENT)
Dept: OTHER | Facility: OTHER | Age: 78
End: 2018-05-21

## 2021-02-19 NOTE — CONSULTS
ED Time Seen By Provider Entered On:  8/29/2017 14:54     Performed On:  8/29/2017 14:54  by Manuel Pimentel MD      Time Seen By Provider   Time Seen by Provider :   8/29/2017 14:54    Manuel Pimentel MD - 8/29/2017 14:54            CONSULT    Patient Name: Ruddy Breen  Patient MRN: [id number]  Date of Service: 3/13/2018   Date / Time Note Created: 3/13/2018 8:35 AM   Referring Provider:Dr Triston Caceres  Provider Creating Note: RICHIE Carrera    PCP: Sterling Villalobos  Attending Provider:  Nathan Billingsley MD    Reason for Consult:  Recurring UTI    History of present illness: Mr Li Argueta 76-year-old male with history uncontrolled diabetes and CVA  Unfortunately, patient is a very weak historian and not answer questions appropriately  Review of electronic medical record does not reveal prior  history  Nursing staff placed Swanson catheter with difficulty for monitoring on admission  Urinalysis was suggestive of UTI dot patient is currently receiving ceftriaxone per primary team   Patient remains afebrile and blood and urine cultures have demonstrated no growth  Using the abdomen pelvis is not available  Urologic consultation is requested further management recommendations  Source:  Chart     Active Problems:   Patient Active Problem List   Diagnosis    Dementia with behavioral disturbance    Type 2 diabetes mellitus with hyperglycemia, without long-term current use of insulin (HCC)    Paroxysmal atrial fibrillation (HCC)    HTN (hypertension)    Hyperlipidemia    UTI (urinary tract infection)    Acute cystitis with hematuria    Vesicular rash    Dysuria    Weakness    Coronary artery disease involving native heart with angina pectoris ([facility] [address] )    History of CVA in adulthood    Aphasia as late effect of cerebrovascular accident    Seizure disorder ([facility] [address] )         Impressions  Recurrent UTI--multifactorial in patient with history of CVA, poorly controlled diabetes and possibly underlying BPH  Gross hematuria--likely secondary to UTI; as well as urethral trauma  It was reported patient was difficult urethral cannulation compounded by patient's chronic anticoagulation (Eliquis)      Recommendations  Patient has all the risk factors for chronic poor bladder emptying  However, patient's catheter was inserted on admission for hemodynamic monitoring  Continue medical optimization and sterilization  Obtain renal ultrasound for baseline upper tract imaging  If there is recommendation for short term rehab, would proceed with void trial at outside facility  If patient fails OP void trial  will require catheter re-insertion  In the interim, we will proceed with arranging for office urologic workup including but not limited to PSA and eventual cystoscopic examination  Past Medical History:   Diagnosis Date    Anxiety     Cardiac disease     Dementia     Diabetes mellitus ([facility] [address] )     Hyperlipidemia     Hypertension     Polio     Psychiatric disorder     Seizures ([facility] )     1 seizure     Stroke [facility])        Past Surgical History:   Procedure Laterality Date    APPENDECTOMY      CARDIAC SURGERY      FL ESOPHAGOGASTRODUODENOSCOPY TRANSORAL DIAGNOSTIC N/A 3/21/2017    Procedure: ESOPHAGOGASTRODUODENOSCOPY (EGD); Surgeon: Clara Connolly MD;  Location: [facility]; Service: Gastroenterology    ROTATOR CUFF REPAIR Left        History reviewed  No pertinent family history  Social History     Social History    Marital status: /Civil Union     Spouse name: N/A    Number of children: N/A    Years of education: N/A     Occupational History    Not on file       Social History Main Topics    Smoking status: Former Smoker    Smokeless tobacco: Never Used    Alcohol use No    Drug use: No    Sexual activity: Not on file     Other Topics Concern    Not on file     Social History Narrative    No narrative on file       No Known Allergies    Review of Systems  10 point review of systems negative except as noted in HPI  Review of Systems - History obtained from unobtainable from patient due to mental status       Chart Review   Allergies, current medications, history, problem list    Vital Signs  BP (!) 180/75 (BP Location: Right arm)   Pulse 57   Temp (!) 97 3 °F (36 3 °C) (Axillary)   Resp 17   Ht 5' 7" (1 702 m)   Wt 97 kg (213 lb 13 5 oz)   SpO2 94%   BMI 33 49 kg/m²     Physical Exam  General appearance: alert, appears stated age and cooperative  Head: Normocephalic, without obvious abnormality, atraumatic  Neck: no adenopathy, no carotid bruit, no JVD, supple, symmetrical, trachea midline and thyroid not enlarged, symmetric, no tenderness/mass/nodules  Lungs: clear to auscultation bilaterally  Heart: regularly irregular rhythm  Abdomen: soft, non-tender; bowel sounds normal; no masses,  no organomegaly  Extremities: extremities normal, warm and well-perfused; no cyanosis, clubbing, or edema  Pulses: 2+ and symmetric  Neurologic: Grossly normal   Urinary catheter to straight drainage patent for clear marquis urine     Laboratory Studies  Lab Results   Component Value Date    HGBA1C 11 1 (H) 03/12/2018    HGBA1C 6 0 (H) 06/13/2014     03/13/2018     06/19/2014    K 3 6 03/13/2018    K 3 2 (L) 06/19/2014     03/13/2018     (L) 06/19/2014    CO2 23 03/13/2018    CO2 26 06/19/2014    GLUCOSE 122 03/13/2018    GLUCOSE 112 06/19/2014    CREATININE 0 87 03/13/2018    CREATININE 0 57 (L) 06/19/2014    BUN 15 03/13/2018    BUN 7 06/19/2014    MG 1 8 03/10/2018    MG 1 7 06/19/2014    PHOS 3 0 06/16/2014     Lab Results   Component Value Date    WBC 8 60 03/12/2018    WBC 4 29 (L) 06/19/2014    RBC 4 98 03/12/2018    RBC 3 60 (L) 06/19/2014    HGB 14 5 03/12/2018    HGB 11 1 (L) 06/19/2014    HCT 44 3 03/12/2018    HCT 34 2 (L) 06/19/2014    MCV 89 03/12/2018    MCV 95 06/19/2014    MCH 29 1 03/12/2018    MCH 30 8 06/19/2014    RDW 14 6 03/12/2018    RDW 14 5 06/19/2014     03/12/2018     06/19/2014       Imaging and Other Studies  )  Ct Head Without Contrast    Result Date: 3/10/2018  Narrative: CT BRAIN - WITHOUT CONTRAST INDICATION:   Fall while on blood thinners   COMPARISON: 2/15/2017  TECHNIQUE:  CT examination of the brain was performed  In addition to axial images, coronal 2D reformatted images were created and submitted for interpretation  Radiation dose length product (DLP) for this visit:  1030 mGy-cm   This examination, like all CT scans performed in the [facility], was performed utilizing techniques to minimize radiation dose exposure, including the use of iterative reconstruction and automated exposure control  IMAGE QUALITY:  Diagnostic  FINDINGS: PARENCHYMA:  Chronic infarcts with encephalomalacia involving the right frontal lobe, right occipital lobe, left parietotemporal region, and left cerebellar hemisphere--unchanged  Background of moderate intrahepatic white matter changes  No acute infarcts  No parenchymal hemorrhage  No mass  VENTRICLES AND EXTRA-AXIAL SPACES:  Ex vacuo dilatation of the atrium and temporal horn of the left lateral ventricle due to encephalomalacia  No hydrocephalus, herniation, or mass effect  No extra-axial or intraventricular hemorrhage  VISUALIZED ORBITS AND PARANASAL SINUSES:  Unremarkable  CALVARIUM AND EXTRACRANIAL SOFT TISSUES:  No displaced or depressed skull fractures  No acute soft tissue swelling  Incidental note of left frontal scalp lipoma measuring 22 mm  Impression: No acute intracranial abnormality or significant change from prior  Multiple old cerebral and cerebellar infarcts   Workstation performed: [id number]       Medications   Scheduled Meds:  Current Facility-Administered Medications:  acetaminophen 650 mg Oral Q6H PRN Elliot Bar MD    amLODIPine 5 mg Oral Daily Bacilio Dominguez MD    apixaban 2 5 mg Oral BID Elliot Bar MD    atorvastatin 20 mg Oral Daily Elliot Bar MD    azaTHIOprine 50 mg Oral Daily Grecia Cooney MD    cefTRIAXone 2,000 mg Intravenous Q24H Luisa Martinez DO Last Rate: 2,000 mg (03/12/18 1300)   donepezil 10 mg Oral HS Grecia Cooney MD    gemfibrozil 600 mg Oral BID AC Grecia LORENZANA Jayy Sweeney MD    hydrocortisone 25 mg Rectal BID Cedric Red MD    insulin glargine 18 Units Subcutaneous HS Christoper Moritz, MD    insulin lispro 1-6 Units Subcutaneous HS Macrina Hunt PA-C    insulin lispro 2-12 Units Subcutaneous TID AC Grecia Johnson MD    insulin lispro 6 Units Subcutaneous TID With Meals Christoper Moritz, MD    levETIRAcetam 500 mg Oral BID Cedric Red MD    lisinopril 10 mg Oral Daily Grecia Johnson MD    LORazepam 0 5 mg Oral Q8H PRN Cedric Red MD    mesalamine 500 mg Oral 4x Daily Grecia Johnson MD    risperiDONE 0 5 mg Oral Q8H PRN Bluford Hashimoto, PA-C    sertraline 50 mg Oral HS Grecia Johnson MD    sodium chloride 50 mL/hr Intravenous Continuous Cedric Red MD Last Rate: 50 mL/hr (03/13/18 0018)     Continuous Infusions:  sodium chloride 50 mL/hr Last Rate: 50 mL/hr (03/13/18 0018)     PRN Meds:   acetaminophen    LORazepam    risperiDONE      Total time spent with patient 20 minutes, >50% spent counseling and/or coordination of care           RICHIE Jara